# Patient Record
Sex: FEMALE | Race: BLACK OR AFRICAN AMERICAN | NOT HISPANIC OR LATINO | Employment: FULL TIME | URBAN - NONMETROPOLITAN AREA
[De-identification: names, ages, dates, MRNs, and addresses within clinical notes are randomized per-mention and may not be internally consistent; named-entity substitution may affect disease eponyms.]

---

## 2019-04-17 ENCOUNTER — OFFICE VISIT (OUTPATIENT)
Dept: NEUROSURGERY | Facility: CLINIC | Age: 49
End: 2019-04-17

## 2019-04-17 ENCOUNTER — APPOINTMENT (OUTPATIENT)
Dept: LAB | Facility: HOSPITAL | Age: 49
End: 2019-04-17

## 2019-04-17 VITALS
SYSTOLIC BLOOD PRESSURE: 100 MMHG | BODY MASS INDEX: 43.58 KG/M2 | DIASTOLIC BLOOD PRESSURE: 70 MMHG | WEIGHT: 202 LBS | HEIGHT: 57 IN

## 2019-04-17 DIAGNOSIS — E66.01 CLASS 3 SEVERE OBESITY DUE TO EXCESS CALORIES WITHOUT SERIOUS COMORBIDITY WITH BODY MASS INDEX (BMI) OF 40.0 TO 44.9 IN ADULT (HCC): ICD-10-CM

## 2019-04-17 DIAGNOSIS — Z98.2 VP (VENTRICULOPERITONEAL) SHUNT STATUS: Primary | ICD-10-CM

## 2019-04-17 DIAGNOSIS — G91.9 HYDROCEPHALUS (HCC): ICD-10-CM

## 2019-04-17 DIAGNOSIS — R50.81 FEVER IN OTHER DISEASES: ICD-10-CM

## 2019-04-17 LAB
ALBUMIN SERPL-MCNC: 4.8 G/DL (ref 3.5–5)
ALBUMIN/GLOB SERPL: 1.3 G/DL (ref 1.1–2.5)
ALP SERPL-CCNC: 63 U/L (ref 24–120)
ALT SERPL W P-5'-P-CCNC: 20 U/L (ref 0–54)
ANION GAP SERPL CALCULATED.3IONS-SCNC: 12 MMOL/L (ref 4–13)
AST SERPL-CCNC: 31 U/L (ref 7–45)
BILIRUB SERPL-MCNC: 0.5 MG/DL (ref 0.1–1)
BUN BLD-MCNC: 13 MG/DL (ref 5–21)
BUN/CREAT SERPL: 22.8 (ref 7–25)
CALCIUM SPEC-SCNC: 10 MG/DL (ref 8.4–10.4)
CHLORIDE SERPL-SCNC: 103 MMOL/L (ref 98–110)
CO2 SERPL-SCNC: 26 MMOL/L (ref 24–31)
CREAT BLD-MCNC: 0.57 MG/DL (ref 0.5–1.4)
CRP SERPL-MCNC: <0.5 MG/DL (ref 0–0.99)
DEPRECATED RDW RBC AUTO: 38.3 FL (ref 40–54)
EOSINOPHIL # BLD MANUAL: 0.35 10*3/MM3 (ref 0–0.7)
EOSINOPHIL NFR BLD MANUAL: 6.1 % (ref 0–4)
ERYTHROCYTE [DISTWIDTH] IN BLOOD BY AUTOMATED COUNT: 12.4 % (ref 12–15)
GFR SERPL CREATININE-BSD FRML MDRD: 138 ML/MIN/1.73
GLOBULIN UR ELPH-MCNC: 3.7 GM/DL
GLUCOSE BLD-MCNC: 80 MG/DL (ref 70–100)
HCT VFR BLD AUTO: 36.5 % (ref 37–47)
HGB BLD-MCNC: 12 G/DL (ref 12–16)
LYMPHOCYTES # BLD MANUAL: 0.8 10*3/MM3 (ref 0.72–4.86)
LYMPHOCYTES NFR BLD MANUAL: 14.1 % (ref 15–45)
LYMPHOCYTES NFR BLD MANUAL: 6.1 % (ref 4–12)
MCH RBC QN AUTO: 27.8 PG (ref 28–32)
MCHC RBC AUTO-ENTMCNC: 32.9 G/DL (ref 33–36)
MCV RBC AUTO: 84.7 FL (ref 82–98)
MONOCYTES # BLD AUTO: 0.35 10*3/MM3 (ref 0.19–1.3)
NEUTROPHILS # BLD AUTO: 4.12 10*3/MM3 (ref 1.87–8.4)
NEUTROPHILS NFR BLD MANUAL: 71.7 % (ref 39–78)
NEUTS BAND NFR BLD MANUAL: 1 % (ref 0–10)
PLAT MORPH BLD: NORMAL
PLATELET # BLD AUTO: 277 10*3/MM3 (ref 130–400)
PMV BLD AUTO: 11 FL (ref 6–12)
POTASSIUM BLD-SCNC: 4.4 MMOL/L (ref 3.5–5.3)
PROT SERPL-MCNC: 8.5 G/DL (ref 6.3–8.7)
RBC # BLD AUTO: 4.31 10*6/MM3 (ref 4.2–5.4)
RBC MORPH BLD: NORMAL
SODIUM BLD-SCNC: 141 MMOL/L (ref 135–145)
VARIANT LYMPHS NFR BLD MANUAL: 1 % (ref 0–5)
WBC MORPH BLD: NORMAL
WBC NRBC COR # BLD: 5.67 10*3/MM3 (ref 4.8–10.8)

## 2019-04-17 PROCEDURE — 99204 OFFICE O/P NEW MOD 45 MIN: CPT | Performed by: NURSE PRACTITIONER

## 2019-04-17 PROCEDURE — 85027 COMPLETE CBC AUTOMATED: CPT | Performed by: NURSE PRACTITIONER

## 2019-04-17 PROCEDURE — 36415 COLL VENOUS BLD VENIPUNCTURE: CPT | Performed by: NURSE PRACTITIONER

## 2019-04-17 PROCEDURE — 86140 C-REACTIVE PROTEIN: CPT | Performed by: NURSE PRACTITIONER

## 2019-04-17 PROCEDURE — 85007 BL SMEAR W/DIFF WBC COUNT: CPT | Performed by: NURSE PRACTITIONER

## 2019-04-17 PROCEDURE — 80053 COMPREHEN METABOLIC PANEL: CPT | Performed by: NURSE PRACTITIONER

## 2019-04-17 NOTE — PATIENT INSTRUCTIONS
"DASH Eating Plan  DASH stands for \"Dietary Approaches to Stop Hypertension.\" The DASH eating plan is a healthy eating plan that has been shown to reduce high blood pressure (hypertension). It may also reduce your risk for type 2 diabetes, heart disease, and stroke. The DASH eating plan may also help with weight loss.  What are tips for following this plan?  General guidelines  · Avoid eating more than 2,300 mg (milligrams) of salt (sodium) a day. If you have hypertension, you may need to reduce your sodium intake to 1,500 mg a day.  · Limit alcohol intake to no more than 1 drink a day for nonpregnant women and 2 drinks a day for men. One drink equals 12 oz of beer, 5 oz of wine, or 1½ oz of hard liquor.  · Work with your health care provider to maintain a healthy body weight or to lose weight. Ask what an ideal weight is for you.  · Get at least 30 minutes of exercise that causes your heart to beat faster (aerobic exercise) most days of the week. Activities may include walking, swimming, or biking.  · Work with your health care provider or diet and nutrition specialist (dietitian) to adjust your eating plan to your individual calorie needs.  Reading food labels  · Check food labels for the amount of sodium per serving. Choose foods with less than 5 percent of the Daily Value of sodium. Generally, foods with less than 300 mg of sodium per serving fit into this eating plan.  · To find whole grains, look for the word \"whole\" as the first word in the ingredient list.  Shopping  · Buy products labeled as \"low-sodium\" or \"no salt added.\"  · Buy fresh foods. Avoid canned foods and premade or frozen meals.  Cooking  · Avoid adding salt when cooking. Use salt-free seasonings or herbs instead of table salt or sea salt. Check with your health care provider or pharmacist before using salt substitutes.  · Do not bowles foods. Cook foods using healthy methods such as baking, boiling, grilling, and broiling instead.  · Cook with " heart-healthy oils, such as olive, canola, soybean, or sunflower oil.  Meal planning    · Eat a balanced diet that includes:  ? 5 or more servings of fruits and vegetables each day. At each meal, try to fill half of your plate with fruits and vegetables.  ? Up to 6-8 servings of whole grains each day.  ? Less than 6 oz of lean meat, poultry, or fish each day. A 3-oz serving of meat is about the same size as a deck of cards. One egg equals 1 oz.  ? 2 servings of low-fat dairy each day.  ? A serving of nuts, seeds, or beans 5 times each week.  ? Heart-healthy fats. Healthy fats called Omega-3 fatty acids are found in foods such as flaxseeds and coldwater fish, like sardines, salmon, and mackerel.  · Limit how much you eat of the following:  ? Canned or prepackaged foods.  ? Food that is high in trans fat, such as fried foods.  ? Food that is high in saturated fat, such as fatty meat.  ? Sweets, desserts, sugary drinks, and other foods with added sugar.  ? Full-fat dairy products.  · Do not salt foods before eating.  · Try to eat at least 2 vegetarian meals each week.  · Eat more home-cooked food and less restaurant, buffet, and fast food.  · When eating at a restaurant, ask that your food be prepared with less salt or no salt, if possible.  What foods are recommended?  The items listed may not be a complete list. Talk with your dietitian about what dietary choices are best for you.  Grains  Whole-grain or whole-wheat bread. Whole-grain or whole-wheat pasta. Brown rice. Oatmeal. Quinoa. Bulgur. Whole-grain and low-sodium cereals. Marcy bread. Low-fat, low-sodium crackers. Whole-wheat flour tortillas.  Vegetables  Fresh or frozen vegetables (raw, steamed, roasted, or grilled). Low-sodium or reduced-sodium tomato and vegetable juice. Low-sodium or reduced-sodium tomato sauce and tomato paste. Low-sodium or reduced-sodium canned vegetables.  Fruits  All fresh, dried, or frozen fruit. Canned fruit in natural juice (without  added sugar).  Meat and other protein foods  Skinless chicken or turkey. Ground chicken or turkey. Pork with fat trimmed off. Fish and seafood. Egg whites. Dried beans, peas, or lentils. Unsalted nuts, nut butters, and seeds. Unsalted canned beans. Lean cuts of beef with fat trimmed off. Low-sodium, lean deli meat.  Dairy  Low-fat (1%) or fat-free (skim) milk. Fat-free, low-fat, or reduced-fat cheeses. Nonfat, low-sodium ricotta or cottage cheese. Low-fat or nonfat yogurt. Low-fat, low-sodium cheese.  Fats and oils  Soft margarine without trans fats. Vegetable oil. Low-fat, reduced-fat, or light mayonnaise and salad dressings (reduced-sodium). Canola, safflower, olive, soybean, and sunflower oils. Avocado.  Seasoning and other foods  Herbs. Spices. Seasoning mixes without salt. Unsalted popcorn and pretzels. Fat-free sweets.  What foods are not recommended?  The items listed may not be a complete list. Talk with your dietitian about what dietary choices are best for you.  Grains  Baked goods made with fat, such as croissants, muffins, or some breads. Dry pasta or rice meal packs.  Vegetables  Creamed or fried vegetables. Vegetables in a cheese sauce. Regular canned vegetables (not low-sodium or reduced-sodium). Regular canned tomato sauce and paste (not low-sodium or reduced-sodium). Regular tomato and vegetable juice (not low-sodium or reduced-sodium). Pickles. Olives.  Fruits  Canned fruit in a light or heavy syrup. Fried fruit. Fruit in cream or butter sauce.  Meat and other protein foods  Fatty cuts of meat. Ribs. Fried meat. Simental. Sausage. Bologna and other processed lunch meats. Salami. Fatback. Hotdogs. Bratwurst. Salted nuts and seeds. Canned beans with added salt. Canned or smoked fish. Whole eggs or egg yolks. Chicken or turkey with skin.  Dairy  Whole or 2% milk, cream, and half-and-half. Whole or full-fat cream cheese. Whole-fat or sweetened yogurt. Full-fat cheese. Nondairy creamers. Whipped toppings.  "Processed cheese and cheese spreads.  Fats and oils  Butter. Stick margarine. Lard. Shortening. Ghee. Simental fat. Tropical oils, such as coconut, palm kernel, or palm oil.  Seasoning and other foods  Salted popcorn and pretzels. Onion salt, garlic salt, seasoned salt, table salt, and sea salt. Worcestershire sauce. Tartar sauce. Barbecue sauce. Teriyaki sauce. Soy sauce, including reduced-sodium. Steak sauce. Canned and packaged gravies. Fish sauce. Oyster sauce. Cocktail sauce. Horseradish that you find on the shelf. Ketchup. Mustard. Meat flavorings and tenderizers. Bouillon cubes. Hot sauce and Tabasco sauce. Premade or packaged marinades. Premade or packaged taco seasonings. Relishes. Regular salad dressings.  Where to find more information:  · National Heart, Lung, and Blood [place]: www.nhlbi.nih.gov  · American Heart Association: www.heart.org  Summary  · The DASH eating plan is a healthy eating plan that has been shown to reduce high blood pressure (hypertension). It may also reduce your risk for type 2 diabetes, heart disease, and stroke.  · With the DASH eating plan, you should limit salt (sodium) intake to 2,300 mg a day. If you have hypertension, you may need to reduce your sodium intake to 1,500 mg a day.  · When on the DASH eating plan, aim to eat more fresh fruits and vegetables, whole grains, lean proteins, low-fat dairy, and heart-healthy fats.  · Work with your health care provider or diet and nutrition specialist (dietitian) to adjust your eating plan to your individual calorie needs.  This information is not intended to replace advice given to you by your health care provider. Make sure you discuss any questions you have with your health care provider.  Document Released: 12/06/2012 Document Revised: 12/11/2017 Document Reviewed: 12/11/2017  [employer] Interactive Patient Education © 2019 [employer]    DASH Eating Plan  DASH stands for \"Dietary Approaches to Stop Hypertension.\" The DASH eating " "plan is a healthy eating plan that has been shown to reduce high blood pressure (hypertension). It may also reduce your risk for type 2 diabetes, heart disease, and stroke. The DASH eating plan may also help with weight loss.  What are tips for following this plan?  General guidelines  · Avoid eating more than 2,300 mg (milligrams) of salt (sodium) a day. If you have hypertension, you may need to reduce your sodium intake to 1,500 mg a day.  · Limit alcohol intake to no more than 1 drink a day for nonpregnant women and 2 drinks a day for men. One drink equals 12 oz of beer, 5 oz of wine, or 1½ oz of hard liquor.  · Work with your health care provider to maintain a healthy body weight or to lose weight. Ask what an ideal weight is for you.  · Get at least 30 minutes of exercise that causes your heart to beat faster (aerobic exercise) most days of the week. Activities may include walking, swimming, or biking.  · Work with your health care provider or diet and nutrition specialist (dietitian) to adjust your eating plan to your individual calorie needs.  Reading food labels  · Check food labels for the amount of sodium per serving. Choose foods with less than 5 percent of the Daily Value of sodium. Generally, foods with less than 300 mg of sodium per serving fit into this eating plan.  · To find whole grains, look for the word \"whole\" as the first word in the ingredient list.  Shopping  · Buy products labeled as \"low-sodium\" or \"no salt added.\"  · Buy fresh foods. Avoid canned foods and premade or frozen meals.  Cooking  · Avoid adding salt when cooking. Use salt-free seasonings or herbs instead of table salt or sea salt. Check with your health care provider or pharmacist before using salt substitutes.  · Do not bowles foods. Cook foods using healthy methods such as baking, boiling, grilling, and broiling instead.  · Cook with heart-healthy oils, such as olive, canola, soybean, or sunflower oil.  Meal planning    · Eat a " balanced diet that includes:  ? 5 or more servings of fruits and vegetables each day. At each meal, try to fill half of your plate with fruits and vegetables.  ? Up to 6-8 servings of whole grains each day.  ? Less than 6 oz of lean meat, poultry, or fish each day. A 3-oz serving of meat is about the same size as a deck of cards. One egg equals 1 oz.  ? 2 servings of low-fat dairy each day.  ? A serving of nuts, seeds, or beans 5 times each week.  ? Heart-healthy fats. Healthy fats called Omega-3 fatty acids are found in foods such as flaxseeds and coldwater fish, like sardines, salmon, and mackerel.  · Limit how much you eat of the following:  ? Canned or prepackaged foods.  ? Food that is high in trans fat, such as fried foods.  ? Food that is high in saturated fat, such as fatty meat.  ? Sweets, desserts, sugary drinks, and other foods with added sugar.  ? Full-fat dairy products.  · Do not salt foods before eating.  · Try to eat at least 2 vegetarian meals each week.  · Eat more home-cooked food and less restaurant, buffet, and fast food.  · When eating at a restaurant, ask that your food be prepared with less salt or no salt, if possible.  What foods are recommended?  The items listed may not be a complete list. Talk with your dietitian about what dietary choices are best for you.  Grains  Whole-grain or whole-wheat bread. Whole-grain or whole-wheat pasta. Brown rice. Oatmeal. Quinoa. Bulgur. Whole-grain and low-sodium cereals. Marcy bread. Low-fat, low-sodium crackers. Whole-wheat flour tortillas.  Vegetables  Fresh or frozen vegetables (raw, steamed, roasted, or grilled). Low-sodium or reduced-sodium tomato and vegetable juice. Low-sodium or reduced-sodium tomato sauce and tomato paste. Low-sodium or reduced-sodium canned vegetables.  Fruits  All fresh, dried, or frozen fruit. Canned fruit in natural juice (without added sugar).  Meat and other protein foods  Skinless chicken or turkey. Ground chicken or  turkey. Pork with fat trimmed off. Fish and seafood. Egg whites. Dried beans, peas, or lentils. Unsalted nuts, nut butters, and seeds. Unsalted canned beans. Lean cuts of beef with fat trimmed off. Low-sodium, lean deli meat.  Dairy  Low-fat (1%) or fat-free (skim) milk. Fat-free, low-fat, or reduced-fat cheeses. Nonfat, low-sodium ricotta or cottage cheese. Low-fat or nonfat yogurt. Low-fat, low-sodium cheese.  Fats and oils  Soft margarine without trans fats. Vegetable oil. Low-fat, reduced-fat, or light mayonnaise and salad dressings (reduced-sodium). Canola, safflower, olive, soybean, and sunflower oils. Avocado.  Seasoning and other foods  Herbs. Spices. Seasoning mixes without salt. Unsalted popcorn and pretzels. Fat-free sweets.  What foods are not recommended?  The items listed may not be a complete list. Talk with your dietitian about what dietary choices are best for you.  Grains  Baked goods made with fat, such as croissants, muffins, or some breads. Dry pasta or rice meal packs.  Vegetables  Creamed or fried vegetables. Vegetables in a cheese sauce. Regular canned vegetables (not low-sodium or reduced-sodium). Regular canned tomato sauce and paste (not low-sodium or reduced-sodium). Regular tomato and vegetable juice (not low-sodium or reduced-sodium). Pickles. Olives.  Fruits  Canned fruit in a light or heavy syrup. Fried fruit. Fruit in cream or butter sauce.  Meat and other protein foods  Fatty cuts of meat. Ribs. Fried meat. Simental. Sausage. Bologna and other processed lunch meats. Salami. Fatback. Hotdogs. Bratwurst. Salted nuts and seeds. Canned beans with added salt. Canned or smoked fish. Whole eggs or egg yolks. Chicken or turkey with skin.  Dairy  Whole or 2% milk, cream, and half-and-half. Whole or full-fat cream cheese. Whole-fat or sweetened yogurt. Full-fat cheese. Nondairy creamers. Whipped toppings. Processed cheese and cheese spreads.  Fats and oils  Butter. Stick margarine. Lard.  Shortening. Ghee. Simental fat. Tropical oils, such as coconut, palm kernel, or palm oil.  Seasoning and other foods  Salted popcorn and pretzels. Onion salt, garlic salt, seasoned salt, table salt, and sea salt. Worcestershire sauce. Tartar sauce. Barbecue sauce. Teriyaki sauce. Soy sauce, including reduced-sodium. Steak sauce. Canned and packaged gravies. Fish sauce. Oyster sauce. Cocktail sauce. Horseradish that you find on the shelf. Ketchup. Mustard. Meat flavorings and tenderizers. Bouillon cubes. Hot sauce and Tabasco sauce. Premade or packaged marinades. Premade or packaged taco seasonings. Relishes. Regular salad dressings.  Where to find more information:  · National Heart, Lung, and Blood [place]: www.nhlbi.nih.gov  · American Heart Association: www.heart.org  Summary  · The DASH eating plan is a healthy eating plan that has been shown to reduce high blood pressure (hypertension). It may also reduce your risk for type 2 diabetes, heart disease, and stroke.  · With the DASH eating plan, you should limit salt (sodium) intake to 2,300 mg a day. If you have hypertension, you may need to reduce your sodium intake to 1,500 mg a day.  · When on the DASH eating plan, aim to eat more fresh fruits and vegetables, whole grains, lean proteins, low-fat dairy, and heart-healthy fats.  · Work with your health care provider or diet and nutrition specialist (dietitian) to adjust your eating plan to your individual calorie needs.  This information is not intended to replace advice given to you by your health care provider. Make sure you discuss any questions you have with your health care provider.  Document Released: 12/06/2012 Document Revised: 12/11/2017 Document Reviewed: 12/11/2017  [employer] Interactive Patient Education © 2019 [employer]

## 2019-04-17 NOTE — PROGRESS NOTES
Primary Care Provider: Krishna Sanches MD  Requesting Provider: No ref. provider found    Chief Complaint:   Chief Complaint   Patient presents with   • Shunt swelling placed on right side of neck     [facility] in [facility] in 1970     History of Present Illness  Consultation today at the request of No ref. provider found    Mary Hunt is a 48 y.o. -American female who presents today with complaint of tenderness to right anterior lateral neck as well as intermittent headaches and dizziness.  Ms. Hunt states she had a  shunt placed in 1970 at the [facility] for hydrocephalus.  More specific reasoning for shunt placement is unknown.  She does report being born prematurely, however gestational age is unknown.    Onset of symptoms began approximately 1 week ago with tenderness overlying distal shunt tubing to right anterior lateral neck.  At onset she as well reports a low-grade fevers (.1).  She additionally reports intermittent headaches, primarily to the posterior aspect of her skull, and intermittent dizziness.  Her headaches occur randomly, typically last 4-5 hours, and resolved with use of ibuprofen and rest.  Last headache was 1 day ago.  Dizziness typically occurs with forward bending.  She denies head trauma, nausea, vomiting, diplopia, or seizure-like activity.  She currently rates the severity of her symptoms 7/10.  No additional concerns at this time.    Review of Systems   Constitutional: Positive for fatigue and fever.   Eyes: Negative.    Respiratory: Negative.    Cardiovascular: Negative.    Gastrointestinal: Negative.    Endocrine: Negative.    Genitourinary: Negative.    Musculoskeletal: Negative.    Skin: Negative.    Allergic/Immunologic: Negative.    Neurological: Positive for light-headedness and headaches.   Hematological: Negative.    Psychiatric/Behavioral: Negative.    All other systems reviewed and are negative.    Past Medical  "History:   Diagnosis Date   • Hydrocephalus     shunt has been placed for 47 years   • Medical history non-contributory      Past Surgical History:   Procedure Laterality Date   • TUBAL ABDOMINAL LIGATION       Family History: family history is not on file.    Social History:  reports that she has never smoked. She has never used smokeless tobacco. She reports that she does not drink alcohol or use drugs.    Medications:  No current outpatient medications on file.    Allergies:  Patient has no known allergies.    Objective   /70   Ht 144.8 cm (57\")   Wt 91.6 kg (202 lb)   BMI 43.71 kg/m²   Physical Exam   Constitutional: She is oriented to person, place, and time. She appears well-developed and well-nourished.  Non-toxic appearance. She does not have a sickly appearance. She does not appear ill. No distress.   HENT:   Head: Normocephalic and atraumatic.   Right Ear: Hearing normal.   Left Ear: Hearing normal.   Mouth/Throat: Mucous membranes are normal.   Eyes: Conjunctivae and EOM are normal. Pupils are equal, round, and reactive to light.   Neck: Trachea normal and full passive range of motion without pain. Neck supple.   Cardiovascular: Normal rate and regular rhythm.   Pulmonary/Chest: Effort normal. No accessory muscle usage. No apnea, no tachypnea and no bradypnea. No respiratory distress.   Abdominal: Soft. Normal appearance.   Neurological: She is alert and oriented to person, place, and time. Gait normal.   Reflex Scores:       Tricep reflexes are 2+ on the right side and 2+ on the left side.       Bicep reflexes are 2+ on the right side and 2+ on the left side.       Brachioradialis reflexes are 2+ on the right side and 2+ on the left side.       Patellar reflexes are 2+ on the right side and 2+ on the left side.       Achilles reflexes are 2+ on the right side and 2+ on the left side.  Skin: Skin is warm, dry and intact.   Psychiatric: She has a normal mood and affect. Her speech is normal and " behavior is normal.   Nursing note and vitals reviewed.    Neurologic Exam     Mental Status   Oriented to person, place, and time.   Attention: normal. Concentration: normal.   Speech: speech is normal   Level of consciousness: alert    Cranial Nerves     CN II   Visual fields full to confrontation.     CN III, IV, VI   Pupils are equal, round, and reactive to light.  Extraocular motions are normal.     CN V   Facial sensation intact.     CN VII   Facial expression full, symmetric.     CN VIII   CN VIII normal.     CN IX, X   CN IX normal.     CN XI   CN XI normal.     Motor Exam   Muscle bulk: normal  Overall muscle tone: normal  Right arm tone: normal  Left arm tone: normal  Right arm pronator drift: absent  Left arm pronator drift: absent  Right leg tone: normal  Left leg tone: normal    Strength   Right deltoid: 5/5  Left deltoid: 5/5  Right biceps: 5/5  Left biceps: 5/5  Right triceps: 5/5  Left triceps: 5/5  Right wrist extension: 5/5  Left wrist extension: 5/5  Right iliopsoas: 5/5  Left iliopsoas: 5/5  Right quadriceps: 5/5  Left quadriceps: 5/5  Right anterior tibial: 5/5  Left anterior tibial: 5/5  Right posterior tibial: 5/5  Left posterior tibial: 5/5    Sensory Exam   Light touch normal.     Gait, Coordination, and Reflexes     Gait  Gait: normal    Tremor   Resting tremor: absent  Intention tremor: absent  Action tremor: absent    Reflexes   Right brachioradialis: 2+  Left brachioradialis: 2+  Right biceps: 2+  Left biceps: 2+  Right triceps: 2+  Left triceps: 2+  Right patellar: 2+  Left patellar: 2+  Right achilles: 2+  Left achilles: 2+  Right : 2+  Left : 2+  Right plantar: normal  Left plantar: normal  Right Osuna: absent  Left Osuna: absent  Right ankle clonus: absent  Left ankle clonus: absent  Right pendular knee jerk: absent  Left pendular knee jerk: absent    Imaging: (independent review and interpretation)                     ASSESSMENT and PLAN  Mary Hunt is a 48 y.o.  female. She  has a past medical history of Hydrocephalus and Medical history non-contributory.. She presents with a new problem of tenderness overlying the right anterior lateral neck, and intermittent headaches and dizziness. Physical exam findings of normal neurologic exam.  Multiple images obtained, mildly dilated lateral ventricles,  shunt remnant to the posterior right lateral ventricle, and what appears to be calcified remnant of a distal boron shunt tubing to the right anterior lateral soft tissue neck.    In regards to reported fever, we will proceed today by obtaining a CBC, CMP, and CRP.  Ms. Hunt will be notified of any abnormal results and will proceed accordingly.    In regards to  shunt status and mild hydrocephalus we will proceed today by obtaining MRI of the brain for further evaluation.  We will also send the patient for a lumbar puncture with opening and closing pressures, as well as routine LP studies.  I additionally advised the patient to follow-up with her ophthalmologist for routine eye exam to include optic disc evaluation.  Dr. Stephens reviewed provided imaging and agrees with this plan of care.  I advised the patient to call to return sooner for worsening headaches, nausea, vomiting, or gait changes.  We will have Ms. Hunt return to follow-up with Dr. Stephens at his next available appointment.  Ms. Hunt agrees with this plan of care.     BMI today is 43.7.  Information on the DASH diet provided in the AVS.  We will continue to provided diet and exercise information with the goal of weight loss at each scheduled appointment.     Mary was seen today for shunt swelling placed on right side of neck.    Diagnoses and all orders for this visit:     (ventriculoperitoneal) shunt status  -     Obtain Informed Consent; Standing  -     Notify Provider if Patient Taking Blood Thinning Agents; Standing  -     Glucose, CSF - Cerebrospinal Fluid, Lumbar Puncture; Standing  -      Protein, CSF - Cerebrospinal Fluid, Lumbar Puncture; Standing  -     Culture, CSF - Cerebrospinal Fluid, Lumbar Puncture; Standing  -     Gram Stain; Standing  -     Cell Count With Differential, CSF Use tube: 1; Standing  -     IR Lumbar Puncture Diagnosis; Future  -     Check & Record Opening & Closing Pressures (LP); Standing  -     MRI Brain Without Contrast; Future    Hydrocephalus  -     Obtain Informed Consent; Standing  -     Notify Provider if Patient Taking Blood Thinning Agents; Standing  -     Glucose, CSF - Cerebrospinal Fluid, Lumbar Puncture; Standing  -     Protein, CSF - Cerebrospinal Fluid, Lumbar Puncture; Standing  -     Culture, CSF - Cerebrospinal Fluid, Lumbar Puncture; Standing  -     Gram Stain; Standing  -     Cell Count With Differential, CSF Use tube: 1; Standing  -     IR Lumbar Puncture Diagnosis; Future  -     Check & Record Opening & Closing Pressures (LP); Standing  -     MRI Brain Without Contrast; Future    Fever in other diseases  -     Notify Provider if Patient Taking Blood Thinning Agents; Standing  -     Glucose, CSF - Cerebrospinal Fluid, Lumbar Puncture; Standing  -     Protein, CSF - Cerebrospinal Fluid, Lumbar Puncture; Standing  -     Culture, CSF - Cerebrospinal Fluid, Lumbar Puncture; Standing  -     Gram Stain; Standing  -     Cell Count With Differential, CSF Use tube: 1; Standing  -     IR Lumbar Puncture Diagnosis; Future  -     Check & Record Opening & Closing Pressures (LP); Standing  -     CBC & Differential  -     Comprehensive Metabolic Panel  -     C-reactive Protein    Class 3 severe obesity due to excess calories without serious comorbidity with body mass index (BMI) of 40.0 to 44.9 in adult (CMS/[facility])    Return for Next scheduled follow up with Dr Stephens next available after testing.    Thank you for this Consultation and the opportunity to participate in [facility].    Sincerely,  VERENICE Palomares    Level of Risk: Moderate due to: undiagnosed new  problem  MDM: Moderate Complexity  (Mod = 63845, High = 35046)

## 2019-04-19 ENCOUNTER — TELEPHONE (OUTPATIENT)
Dept: NEUROSURGERY | Facility: CLINIC | Age: 49
End: 2019-04-19

## 2019-04-19 NOTE — TELEPHONE ENCOUNTER
----- Message from VERENICE Palomares sent at 4/18/2019  4:45 PM CDT -----  Relatively normal labs.      called patient with results of lab work. Patient voiced understanding.

## 2019-05-17 ENCOUNTER — APPOINTMENT (OUTPATIENT)
Dept: GENERAL RADIOLOGY | Facility: HOSPITAL | Age: 49
End: 2019-05-17

## 2019-05-17 ENCOUNTER — APPOINTMENT (OUTPATIENT)
Dept: MRI IMAGING | Facility: HOSPITAL | Age: 49
End: 2019-05-17

## 2019-05-31 ENCOUNTER — HOSPITAL ENCOUNTER (OUTPATIENT)
Dept: MRI IMAGING | Facility: HOSPITAL | Age: 49
Discharge: HOME | End: 2019-05-31
Admitting: RADIOLOGY

## 2019-05-31 ENCOUNTER — APPOINTMENT (OUTPATIENT)
Dept: GENERAL RADIOLOGY | Facility: HOSPITAL | Age: 49
End: 2019-05-31

## 2019-05-31 ENCOUNTER — HOSPITAL ENCOUNTER (OUTPATIENT)
Dept: GENERAL RADIOLOGY | Facility: HOSPITAL | Age: 49
Discharge: HOME | End: 2019-05-31

## 2019-05-31 VITALS
SYSTOLIC BLOOD PRESSURE: 131 MMHG | OXYGEN SATURATION: 99 % | HEART RATE: 72 BPM | WEIGHT: 198.13 LBS | TEMPERATURE: 97.7 F | HEIGHT: 57 IN | BODY MASS INDEX: 42.74 KG/M2 | DIASTOLIC BLOOD PRESSURE: 95 MMHG | RESPIRATION RATE: 12 BRPM

## 2019-05-31 DIAGNOSIS — R50.81 FEVER IN OTHER DISEASES: ICD-10-CM

## 2019-05-31 DIAGNOSIS — G91.9 HYDROCEPHALUS (HCC): ICD-10-CM

## 2019-05-31 DIAGNOSIS — Z98.2 VP (VENTRICULOPERITONEAL) SHUNT STATUS: ICD-10-CM

## 2019-05-31 LAB
APPEARANCE CSF: CLEAR
APPEARANCE CSF: CLEAR
APTT PPP: 29.2 SECONDS (ref 24.1–35)
COLOR CSF: COLORLESS
COLOR CSF: COLORLESS
COLOR SPUN CSF: COLORLESS
COLOR SPUN CSF: COLORLESS
GLUCOSE CSF-MCNC: 50 MG/DL (ref 40–70)
INR PPP: 1.01 (ref 0.91–1.09)
METHOD: NORMAL
METHOD: NORMAL
NUC CELL # CSF MANUAL: 1 /MM3 (ref 0–8)
NUC CELL # CSF MANUAL: 1 /MM3 (ref 0–8)
PLATELET # BLD AUTO: 267 10*3/MM3 (ref 130–400)
PROT CSF-MCNC: 56 MG/DL (ref 12–60)
PROTHROMBIN TIME: 13.6 SECONDS (ref 11.9–14.6)
RBC # CSF MANUAL: 0 /MM3 (ref 0–0)
RBC # CSF MANUAL: 0 /MM3 (ref 0–0)
SPECIMEN VOL CSF: 14 ML
SPECIMEN VOL CSF: 14 ML
TUBE # CSF: 1
TUBE # CSF: 2

## 2019-05-31 PROCEDURE — 87070 CULTURE OTHR SPECIMN AEROBIC: CPT | Performed by: NURSE PRACTITIONER

## 2019-05-31 PROCEDURE — 85610 PROTHROMBIN TIME: CPT | Performed by: NURSE PRACTITIONER

## 2019-05-31 PROCEDURE — 85730 THROMBOPLASTIN TIME PARTIAL: CPT | Performed by: NURSE PRACTITIONER

## 2019-05-31 PROCEDURE — 87015 SPECIMEN INFECT AGNT CONCNTJ: CPT | Performed by: NURSE PRACTITIONER

## 2019-05-31 PROCEDURE — 85049 AUTOMATED PLATELET COUNT: CPT | Performed by: NURSE PRACTITIONER

## 2019-05-31 PROCEDURE — 77003 FLUOROGUIDE FOR SPINE INJECT: CPT

## 2019-05-31 PROCEDURE — 70551 MRI BRAIN STEM W/O DYE: CPT

## 2019-05-31 PROCEDURE — 82945 GLUCOSE OTHER FLUID: CPT | Performed by: NURSE PRACTITIONER

## 2019-05-31 PROCEDURE — 89050 BODY FLUID CELL COUNT: CPT | Performed by: NURSE PRACTITIONER

## 2019-05-31 PROCEDURE — 87205 SMEAR GRAM STAIN: CPT | Performed by: NURSE PRACTITIONER

## 2019-05-31 PROCEDURE — 84157 ASSAY OF PROTEIN OTHER: CPT | Performed by: NURSE PRACTITIONER

## 2019-05-31 RX ORDER — LIDOCAINE HYDROCHLORIDE 10 MG/ML
5 INJECTION, SOLUTION EPIDURAL; INFILTRATION; INTRACAUDAL; PERINEURAL ONCE
Status: DISCONTINUED | OUTPATIENT
Start: 2019-05-31 | End: 2019-06-01 | Stop reason: HOSPADM

## 2019-06-05 LAB
BACTERIA SPEC AEROBE CULT: NORMAL
GRAM STN SPEC: NORMAL
GRAM STN SPEC: NORMAL

## 2019-06-06 ENCOUNTER — TELEPHONE (OUTPATIENT)
Dept: NEUROSURGERY | Facility: CLINIC | Age: 49
End: 2019-06-06

## 2019-06-06 NOTE — TELEPHONE ENCOUNTER
Spoke with Ms. Marilee and notified her that her opening lumbar pressure was normal and CSF studies were  Unremarkable.  I advised her to keep scheduled appointment with Dr. Stephens.  Call to return sooner for any new or additional concerns.    Gonzalez Camara, APRN

## 2019-06-24 ENCOUNTER — OFFICE VISIT (OUTPATIENT)
Dept: NEUROSURGERY | Facility: CLINIC | Age: 49
End: 2019-06-24

## 2019-06-24 VITALS — HEIGHT: 57 IN | BODY MASS INDEX: 42.72 KG/M2 | WEIGHT: 198 LBS

## 2019-06-24 DIAGNOSIS — E66.01 CLASS 3 SEVERE OBESITY DUE TO EXCESS CALORIES WITH SERIOUS COMORBIDITY AND BODY MASS INDEX (BMI) OF 40.0 TO 44.9 IN ADULT (HCC): ICD-10-CM

## 2019-06-24 DIAGNOSIS — G91.0 COMMUNICATING HYDROCEPHALUS (HCC): Primary | ICD-10-CM

## 2019-06-24 DIAGNOSIS — Z98.2 VP (VENTRICULOPERITONEAL) SHUNT STATUS: ICD-10-CM

## 2019-06-24 PROCEDURE — 99214 OFFICE O/P EST MOD 30 MIN: CPT | Performed by: NEUROLOGICAL SURGERY

## 2019-06-24 NOTE — PROGRESS NOTES
Chief complaint:   Chief Complaint   Patient presents with   • Follow-up     Follow for testing regarding her  shunt. Patient has no complaints.       Subjective     HPI:   From previous note: Mary Hunt is a 48 y.o. female. She  has a past medical history of Hydrocephalus and Medical history non-contributory.. She presents with a new problem of tenderness overlying the right anterior lateral neck, and intermittent headaches and dizziness. Physical exam findings of normal neurologic exam.  Multiple images obtained, mildly dilated lateral ventricles,  shunt remnant to the posterior right lateral ventricle, and what appears to be calcified remnant of a distal boron shunt tubing to the right anterior lateral soft tissue neck.     In regards to reported fever, we will proceed today by obtaining a CBC, CMP, and CRP.  Ms. Hunt will be notified of any abnormal results and will proceed accordingly.     In regards to  shunt status and mild hydrocephalus we will proceed today by obtaining MRI of the brain for further evaluation.  We will also send the patient for a lumbar puncture with opening and closing pressures, as well as routine LP studies.  I additionally advised the patient to follow-up with her ophthalmologist for routine eye exam to include optic disc evaluation.  Dr. Stephens reviewed provided imaging and agrees with this plan of care.  I advised the patient to call to return sooner for worsening headaches, nausea, vomiting, or gait changes.  We will have Ms. Hunt return to follow-up with Dr. Stephens at his next available appointment.  Ms. Hunt agrees with this plan of care.           Interval History: Mary returns today for follow-up regarding previous shunt.  Per documentation patient report the patient received a  shunt.  This was performed at [facility] by an unknown physician.  There is been no revisions in the interim.  She has done well and developed normally.   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  She recently had a fall which initiated a noncontrast head CT in April.  This was subsequently followed by MRI in June.  She also had a lumbar puncture performed which showed an opening pressure of 17.  The patient denies headaches, nausea, vomiting, or vision changes.  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  She did however note that after the fall she did have worsening swelling of her calcified tubing in her neck.    Review of Systems   Constitutional: Negative.    HENT: Negative.    Eyes: Negative.    Respiratory: Negative.    Cardiovascular: Negative.    Gastrointestinal: Negative.    Endocrine: Negative.    Genitourinary: Negative.    Musculoskeletal: Negative.    Skin: Negative.    Allergic/Immunologic: Negative.    Neurological: Negative.    Hematological: Negative.    Psychiatric/Behavioral: Negative.        PFSH:  Past Medical History:   Diagnosis Date   • Hydrocephalus     shunt has been placed for 47 years   • Medical history non-contributory        Past Surgical History:   Procedure Laterality Date   • TUBAL ABDOMINAL LIGATION         Objective      No current outpatient medications on file.     No current facility-administered medications for this visit.        Vital Signs  Ht 144.8 cm (57\")   Wt 89.8 kg (198 lb)   BMI 42.85 kg/m²   Physical Exam   Constitutional: She is oriented to person, place, and time.   HENT:   Head:       Eyes: EOM are normal. Pupils are equal, round, and reactive to light.   Neurological: She is oriented to person, place, and time. She has normal strength. Gait normal.   Psychiatric: Her speech is normal.     Neurologic Exam     Mental Status   Oriented to person, place, and time.   Speech: speech is normal     Cranial Nerves     CN II   Visual fields full to confrontation.     CN III, IV, VI   Pupils are equal, round, and reactive to " light.  Extraocular motions are normal.     CN V   Right facial sensation deficit: none  Left facial sensation deficit: none    CN VII   Facial expression full, symmetric.     CN VIII   Hearing: intact    CN IX, X   Palate: symmetric    CN XI   Right sternocleidomastoid strength: normal  Left sternocleidomastoid strength: normal    CN XII   Tongue deviation: none    Motor Exam     Strength   Strength 5/5 throughout.     Sensory Exam   Right arm light touch: normal  Left arm light touch: normal    Gait, Coordination, and Reflexes     Gait  Gait: normal    Reflexes   Reflexes 2+ except as noted.     (12 bullet pts)    Results Review:     EXAMINATION:  IR LUMBAR PUNCTURE DIAG/THERA-  5/31/2019 11:07 AM CDT     HISTORY: shunt status, hydrocephalus; Z98.2-Presence of cerebrospinal  fluid drainage device; G91.9-Hydrocephalus, unspecified; R50.81-Fever  presenting with conditions classified elsewhere      COMPARISON: No comparison study.     TECHNIQUE:      Image number: 1.     Fluoroscopy time: 0.8 minutes     The risk and procedure were explained to the patient. Informed consent  was obtained. Timeout was executed per departmental policy.     Under sterile conditions, using 1% lidocaine as local anesthetic and  fluoroscopic guidance, a 20-gauge spinal needle was placed into the  thecal sac at the L3-L4 level.     Patient was placed on their side and an opening pressure of 12 cm was  measured.     Approximately 15 cc of clear cerebral spinal fluid was harvested into  test tubes and sent for laboratory for evaluation as requested.     Minimal blood loss observed, less than 1 mL.     The patient tolerated the procedure well without immediate  complications.     IMPRESSION:  1. Fluoroscopic guided lumbar puncture performed as described above,  without immediate complications.  This report was finalized on 05/31/2019 15:21 by Dr. Evans Jain MD.    Indication: Hydrocephalus        Technique: Multisequence, multiplanar MRI  of the brain without contrast.     Comparison: CT scan dated 2019     Findings:      There is no diffusion signal abnormality to suggest acute ischemia. No  intra-axial or extra-axial hemorrhage. No visualized mass lesion. Right  temporal approach ventriculostomy. Prominence of the occipital horns of  the lateral ventricles. The frontal horns the lateral ventricles are  nondilated. The third and fourth ventricles appear appropriate. The  septum pellucidum is nonvisualized. The splenium of the corpus callosum  is notably thin, asymmetric relative to the genu and anterior body. The  basal cisterns are symmetric. Posterior fossa structures are  unremarkable. The major intracranial flow-voids are preserved. Normal  orbits. The paranasal sinuses are clear. Mastoid air cells are clear.  Normal bone marrow signal.      IMPRESSION:  Impression:     1. There is a right temporal approach ventriculostomy. The occipital  horns of the lateral ventricles are prominent, however, the frontal and  temporal horns appear appropriate. Similarly, the third and fourth  ventricles appear appropriate. 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  This report was finalized on 2019 13:42 by Dr Ramna Shelton, .          Assessment/Plan: Mary Hunt is a 49 y.o. female with a significant comorbidity agenesis of the corpus callosum, colpocephaly, status post  ventricular peritoneal shunting without revision. She presents with a known problem of swelling in her neck along with residual tubing. Physical exam findings of illogically intact.  Her imaging shows stable MRI with regards to ventricular size compared to her prior CT.     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  She underwent a  shunt.  This appears to have deteriorated and not been functioning for many years.  Her imaging shows stable from ventricles as well as lumbar puncture with normal opening pressure.  At this point I would consider her to not have hydrocephalus and would recommend further conservative management.  In regards to the swelling along her shunt tubing.  It appears that she has extensive calcifications along this tubing which is not uncommon with the older shunts which were either made of latex or boron encrusted which tended to create a significant reaction.  At some point I imagine she twisted her neck and caused a crack in the granulation tissue surrounding the shunt tubing thus initiating a secondary reaction.  I would recommend continued conservative therapy.  Should this become painful, cosmetically displeasing, or compressive of the underlying structures then we would consider removal.  But as of this time it is best to leave it in place.    Obesity Counseling  Mary has a BMI 40.0-49.9       Classification: class III obesity.  We spent 5 minutes in weight management counseling including discussing current weight, current diet, and exercise patterns.  Additional we set goals for weight reduction.  Therefore above normal parameters. Recommendations include: educational material and exercise counseling.       1. Communicating hydrocephalus    2.  (ventriculoperitoneal) shunt status    3. Class 3 severe obesity due to excess calories with serious comorbidity and body mass index (BMI) of 40.0 to 44.9 in adult (CMS/[facility])        Recommendations:  Mary was seen today for follow-up.    Diagnoses and all orders for this visit:    Communicating hydrocephalus     (ventriculoperitoneal) shunt status    Class 3 severe obesity due to excess calories with serious comorbidity and body mass index (BMI) of 40.0 to 44.9 in adult (CMS/[facility])        Return if symptoms worsen or  fail to improve.    Level of Risk: Moderate due to: two stable chronic illnesses  MDM: Moderate Complexity  (Mod = 03700, High = 46870)    Thank you, for allowing me to continue to participate in the care of this patient.    Sincerely,  Edu Stephens MD

## 2021-05-17 ENCOUNTER — TELEPHONE (OUTPATIENT)
Dept: BARIATRICS/WEIGHT MGMT | Facility: CLINIC | Age: 51
End: 2021-05-17

## 2021-05-18 ENCOUNTER — OFFICE VISIT (OUTPATIENT)
Dept: BARIATRICS/WEIGHT MGMT | Facility: HOSPITAL | Age: 51
End: 2021-05-18

## 2021-05-18 ENCOUNTER — OFFICE VISIT (OUTPATIENT)
Dept: BARIATRICS/WEIGHT MGMT | Facility: CLINIC | Age: 51
End: 2021-05-18

## 2021-05-18 VITALS
TEMPERATURE: 98.5 F | OXYGEN SATURATION: 95 % | BODY MASS INDEX: 45.59 KG/M2 | DIASTOLIC BLOOD PRESSURE: 81 MMHG | WEIGHT: 217.2 LBS | HEART RATE: 81 BPM | HEIGHT: 58 IN | SYSTOLIC BLOOD PRESSURE: 142 MMHG

## 2021-05-18 DIAGNOSIS — E66.01 CLASS 3 SEVERE OBESITY DUE TO EXCESS CALORIES WITH SERIOUS COMORBIDITY AND BODY MASS INDEX (BMI) OF 45.0 TO 49.9 IN ADULT (HCC): Primary | ICD-10-CM

## 2021-05-18 PROCEDURE — 99203 OFFICE O/P NEW LOW 30 MIN: CPT | Performed by: SURGERY

## 2021-05-18 RX ORDER — CYCLOBENZAPRINE HCL 10 MG
10 TABLET ORAL 3 TIMES DAILY PRN
COMMUNITY
End: 2021-09-13

## 2021-05-18 NOTE — PROGRESS NOTES
Nutrition Services    Patient Name:  Mary Hunt  YOB: 1970  MRN: [id number]  Admit Date:  (Not on file)    NUTRITION BARIATRIC/M[facility] NOTE     Visit-1   Initial Assessment   BA#   MWL    Anthropometrics   Height: 57.75 in  Weight: 217 lbs 3.2 oz  BMI: 45.79    Nutrition Recall  Eating __2____ meals daily   Snacking  Excessive sweet intake  Drinking carbonated beverages  Drinking less than 64 fluid ounces-16 oz per day    Education    Goal Setting and Information Packet  4 meal per day diet plan  4 meal per day sample menu    Nutrition Goals   Eat __4___ meals per day with protein  Eat protein first at meals  Protein goal: 65gms   discussed protein guidelines for shakes and bar  Eliminate snacks  Healthier food choices  Portion control / Use smaller plate   Eliminate soda  Increase fluid intake to 64 ounces per day      Electronically signed by:  Anaid Mcbride  05/18/21 10:48 CDT

## 2021-05-18 NOTE — PROGRESS NOTES
Patient Care Team:  Krishna Sanches MD as PCP - General (Family Medicine)    Reason for Visit:  Surgical Weight loss    Subjective     Patient is a 50 y.o. female presents with morbid obesity and her Body mass index is 45.79 kg/m².     She is here for discussion of surgical weight loss options.  She stated she has been with the disease of obesity for year(s).  She stated she suffers from morbid obesity due to her weight gain.  She stated that weight loss helps alleviate these symptoms.   She stated that she has tried multiple diet regimens including the keto diet, Slim fast and medication such as Dexatrim to help with weight loss.  She stated that she has attempted these conservative methods for weight loss without maintaining long term success.  Today she would like to discuss surgical weight loss options such as the Laparoscopic Sleeve Gastrectomy or the Laparoscopic R - Y Gastric Bypass.     Review of Systems  General ROS: positive for  - fatigue, night sweats and weight gain  Psychological ROS: negative  Respiratory ROS: no cough, shortness of breath, or wheezing  Cardiovascular ROS: no chest pain or dyspnea on exertion  positive for - edema  Gastrointestinal ROS: no abdominal pain, change in bowel habits, or black or bloody stools  Musculoskeletal ROS: positive for - joint pain    History  Past Medical History:   Diagnosis Date   • Hydrocephalus (CMS/HCC)     shunt has been placed for 47 years   • Medical history non-contributory      Past Surgical History:   Procedure Laterality Date   • TUBAL ABDOMINAL LIGATION       History reviewed. No pertinent family history.  Social History     Tobacco Use   • Smoking status: Never Smoker   • Smokeless tobacco: Never Used   Substance Use Topics   • Alcohol use: No   • Drug use: No     E-cigarette/Vaping     E-cigarette/Vaping Substances     (Not in a hospital admission)    Allergies:  Patient has no known allergies.      Current Outpatient Medications:   •   "cyclobenzaprine (FLEXERIL) 10 MG tablet, Take 10 mg by mouth 3 (Three) Times a Day As Needed for Muscle Spasms., Disp: , Rfl:     Objective     Vital Signs  Temp:  [98.5 °F (36.9 °C)] 98.5 °F (36.9 °C)  Heart Rate:  [81] 81  BP: (142)/(81) 142/81  Body mass index is 45.79 kg/m².      05/18/21  0941   Weight: 98.5 kg (217 lb 3.2 oz)       General Appearance:  awake, alert, oriented, in no acute distress  Lungs:  Normal expansion.  Clear to auscultation.  No rales, rhonchi, or wheezing.  Heart:  Heart regular rate and rhythm  Abdomen:  Soft, non-tender, normal bowel sounds; no bruits, organomegaly or masses.  Abnormal shape: obese      Results Review:   None        Assessment/Plan   Encounter Diagnoses   Name Primary?   • Class 3 severe obesity due to excess calories with serious comorbidity and body mass index (BMI) of 45.0 to 49.9 in adult (CMS/[facility]) Yes       She has been provided a structured dietary regimen based off of her behavior.  I discussed with the patient the etiology of the disease of obesity and the potential comorbid conditions associated with this disease.  She was instructed to follow the dietary regimen and follow-up with our program in 1 month's time with any additional questions as they may arise during this time.  We emphasized on focusing on proteins and meals high in fiber as well as adequate hydration that exceed 64 ounces of water daily.    I explained that I anticipate the patient to lose weight prior to her next monthly visit.  I have also explained that they need to record or document when they are going to have the \"cheat day\".  I believe this patient will be a good candidate for weight loss surgery.    She has chosen laparoscopic sleeve gastrectomy. I agree with this decision.  I have discussed the Kayla - Y Gastric Bypass, laparoscopic sleeve gastrectomy and the Laparoscopic Gastric Band procedures to provide the alternatives which also includes non surgical weight loss options as well.  " We discussed the benefits of the surgeries including the benefit of weight loss and the possible reversal of co-morbid conditions associated with morbid obesity. I explained to her that prior to making a definitive decision on the type of surgery she will require a study of the upper GI system.  I explained to her why the EGD is recommended.    I discussed the patient's findings and my recommendations with patient.     I have also recommended that she obtain completion of a medically supervised weight loss program prior to surgery consideration.    Dr. Matthew Oh MD FACS    05/18/21  10:21 CDT  Patient Care Team:  Krishna Sanches MD as PCP - General (Family Medicine)

## 2021-06-15 ENCOUNTER — TELEPHONE (OUTPATIENT)
Dept: BARIATRICS/WEIGHT MGMT | Facility: CLINIC | Age: 51
End: 2021-06-15

## 2021-06-16 ENCOUNTER — OFFICE VISIT (OUTPATIENT)
Dept: BARIATRICS/WEIGHT MGMT | Facility: CLINIC | Age: 51
End: 2021-06-16

## 2021-06-16 VITALS
HEART RATE: 90 BPM | WEIGHT: 213.8 LBS | SYSTOLIC BLOOD PRESSURE: 134 MMHG | DIASTOLIC BLOOD PRESSURE: 84 MMHG | BODY MASS INDEX: 41.98 KG/M2 | HEIGHT: 60 IN | TEMPERATURE: 97.2 F | OXYGEN SATURATION: 97 %

## 2021-06-16 DIAGNOSIS — E66.01 CLASS 3 SEVERE OBESITY DUE TO EXCESS CALORIES WITH SERIOUS COMORBIDITY AND BODY MASS INDEX (BMI) OF 40.0 TO 44.9 IN ADULT (HCC): Primary | ICD-10-CM

## 2021-06-16 PROCEDURE — 99213 OFFICE O/P EST LOW 20 MIN: CPT | Performed by: NURSE PRACTITIONER

## 2021-06-16 NOTE — PROGRESS NOTES
"Patient Care Team:  Krishna Sanches MD as PCP - General (Family Medicine)    Reason for Visit:  Surgical Weight loss    Subjective   Mary Hunt is a 50 y.o. female.     Mary is here for follow-up and continued medical management of her morbid obesity.  She is currently on a prescription diet.  Mary previously was to apply dietary changes such as following the meal plan as directed.  She admits to eating 4 meals per day.  As a result she lost weight since her last visit.  Admits to drinking 64 ounces of water and 63 grams of protein each day. Denies soda intake and smoking.    Review Of Systems:  Review of Systems   Constitutional: Positive for fatigue.   Respiratory: Negative.    Cardiovascular: Negative.    Gastrointestinal: Positive for constipation.   Endocrine: Negative.    Musculoskeletal: Positive for arthralgias.   Skin:        Rash in skin folds   Psychiatric/Behavioral: Negative.          The following portions of the patient's history were reviewed and updated as appropriate: allergies, current medications, past family history, past medical history, past social history, past surgical history, and problem list.    Objective   /84 (BP Location: Right arm, Patient Position: Sitting, Cuff Size: Adult)   Pulse 90   Temp 97.2 °F (36.2 °C)   Ht 152.4 cm (60\")   Wt 97 kg (213 lb 12.8 oz)   SpO2 97%   BMI 41.75 kg/m²       06/16/21  1416   Weight: 97 kg (213 lb 12.8 oz)       Physical Exam  Vitals reviewed.   Constitutional:       Appearance: She is obese.   Cardiovascular:      Rate and Rhythm: Normal rate and regular rhythm.   Pulmonary:      Effort: Pulmonary effort is normal.   Abdominal:      General: Bowel sounds are normal.   Musculoskeletal:         General: Normal range of motion.   Skin:     General: Skin is warm and dry.   Neurological:      Mental Status: She is alert and oriented to person, place, and time.   Psychiatric:         Mood and Affect: Mood normal.         " Behavior: Behavior normal.         Patient's Body mass index is 41.75 kg/m². indicating that she is morbidly obese (BMI > 40 or > 35 with obesity - related health condition). Obesity-related health conditions include the following: none. Obesity is improving with treatment. BMI is is above average; BMI management plan is completed. We discussed portion control and increasing exercise..     Assessment/Plan     Encounter Diagnoses   Name Primary?   • Class 3 severe obesity due to excess calories with serious comorbidity and body mass index (BMI) of 40.0 to 44.9 in adult (CMS/[facility]) Yes       Mary Hunt was seen today for follow-up, obesity, nutrition counseling and weight loss.  She has lost weight since her last visit.  Today we discussed healthy changes in lifestyle, diet, and exercise. Dietician consultation obtained.  Mary Hunt had received handouts to her explaining the recommendation on portion sizes/appetite control/reading nutrition labels.   Intensive behavioral therapy for obesity was done today as well.     Goals for this month are:   1. Will make psychiatry appointment today   2. Advised to slightly increase protein intake.   3. Continue to avoid soda intake, patient has fully eliminated soda since last appointment.     Follow up in one month for a weight recheck.

## 2021-07-12 ENCOUNTER — TELEPHONE (OUTPATIENT)
Dept: BARIATRICS/WEIGHT MGMT | Facility: CLINIC | Age: 51
End: 2021-07-12

## 2021-07-12 NOTE — TELEPHONE ENCOUNTER
Pt calling to check on pysch referral and if she needs to keep her appt for this week. Let pt know to keep appt and we will work on her referral and get it sent where it needs to go. Pt voiced understanding

## 2021-07-14 ENCOUNTER — TELEPHONE (OUTPATIENT)
Dept: BARIATRICS/WEIGHT MGMT | Facility: CLINIC | Age: 51
End: 2021-07-14

## 2021-07-15 ENCOUNTER — OFFICE VISIT (OUTPATIENT)
Dept: BARIATRICS/WEIGHT MGMT | Facility: CLINIC | Age: 51
End: 2021-07-15

## 2021-07-15 ENCOUNTER — TRANSCRIBE ORDERS (OUTPATIENT)
Dept: ADMINISTRATIVE | Facility: HOSPITAL | Age: 51
End: 2021-07-15

## 2021-07-15 ENCOUNTER — HOSPITAL ENCOUNTER (OUTPATIENT)
Dept: CARDIOLOGY | Facility: HOSPITAL | Age: 51
Discharge: HOME | End: 2021-07-15
Admitting: NURSE PRACTITIONER

## 2021-07-15 VITALS
TEMPERATURE: 97.7 F | OXYGEN SATURATION: 98 % | BODY MASS INDEX: 41.98 KG/M2 | SYSTOLIC BLOOD PRESSURE: 120 MMHG | HEART RATE: 83 BPM | WEIGHT: 213.8 LBS | HEIGHT: 60 IN | DIASTOLIC BLOOD PRESSURE: 74 MMHG

## 2021-07-15 DIAGNOSIS — Z87.19 HISTORY OF ESOPHAGEAL REFLUX: ICD-10-CM

## 2021-07-15 DIAGNOSIS — E66.01 CLASS 3 SEVERE OBESITY DUE TO EXCESS CALORIES WITH SERIOUS COMORBIDITY AND BODY MASS INDEX (BMI) OF 40.0 TO 44.9 IN ADULT (HCC): Primary | ICD-10-CM

## 2021-07-15 DIAGNOSIS — E66.01 MORBID OBESITY (HCC): Primary | ICD-10-CM

## 2021-07-15 PROCEDURE — 93010 ELECTROCARDIOGRAM REPORT: CPT | Performed by: INTERNAL MEDICINE

## 2021-07-15 PROCEDURE — 99214 OFFICE O/P EST MOD 30 MIN: CPT | Performed by: NURSE PRACTITIONER

## 2021-07-15 PROCEDURE — 93005 ELECTROCARDIOGRAM TRACING: CPT

## 2021-07-15 RX ORDER — PAROXETINE HYDROCHLORIDE 20 MG/1
20 TABLET, FILM COATED ORAL EVERY MORNING
COMMUNITY
End: 2021-08-12

## 2021-07-15 NOTE — PROGRESS NOTES
"Patient Care Team:  Krishna Sanches MD as PCP - General (Family Medicine)    Reason for Visit:  Surgical Weight loss    Subjective   Mary Hunt is a 51 y.o. female.     Mary is here for follow-up and continued medical management of her morbid obesity.  She is currently on a prescription diet.  Mary previously was to apply dietary changes such as following the meal plan as directed.  She admits to eating 4 meals per day.  As a result she remained the same weight since her last visit.    Review Of Systems:  Review of Systems   Constitutional: Positive for fatigue.   Respiratory: Negative.    Cardiovascular: Negative.    Gastrointestinal: Negative.    Endocrine: Negative.    Musculoskeletal: Positive for joint swelling.   Psychiatric/Behavioral: Negative.          The following portions of the patient's history were reviewed and updated as appropriate: allergies, current medications, past family history, past medical history, past social history, past surgical history, and problem list.    Objective   /74 (BP Location: Right arm, Patient Position: Sitting, Cuff Size: Adult)   Pulse 83   Temp 97.7 °F (36.5 °C)   Ht 152.4 cm (60\")   Wt 97 kg (213 lb 12.8 oz)   SpO2 98%   BMI 41.75 kg/m²       07/15/21  0917   Weight: 97 kg (213 lb 12.8 oz)       Physical Exam  Vitals reviewed.   Constitutional:       Appearance: She is obese.   Cardiovascular:      Rate and Rhythm: Normal rate and regular rhythm.   Pulmonary:      Effort: Pulmonary effort is normal.   Abdominal:      General: Bowel sounds are normal.      Palpations: Abdomen is soft.   Musculoskeletal:         General: Normal range of motion.   Skin:     General: Skin is warm and dry.   Neurological:      Mental Status: She is alert and oriented to person, place, and time.   Psychiatric:         Mood and Affect: Mood normal.         Behavior: Behavior normal.         Patient's Body mass index is 41.75 kg/m². indicating that she is morbidly " obese (BMI > 40 or > 35 with obesity - related health condition). Obesity-related health conditions include the following: none. Obesity is unchanged. BMI is is above average; BMI management plan is completed. We discussed portion control and increasing exercise..     Assessment/Plan     Encounter Diagnoses   Name Primary?   • Class 3 severe obesity due to excess calories with serious comorbidity and body mass index (BMI) of 40.0 to 44.9 in adult (CMS/[facility]) Yes       Mary Hunt was seen today for follow-up, obesity, nutrition counseling and weight loss.  She has remained the same  weight since her last visit.  Today we discussed healthy changes in lifestyle, diet, and exercise. Dietician consultation obtained.  Mary Hunt had received handouts to her explaining the recommendation on portion sizes/appetite control/reading nutrition labels.   Intensive behavioral therapy for obesity was done today as well.       I believe this patient will be a good candidate for weight loss surgery.  I have discussed the Kayla - Y Gastric Bypass, laparoscopic sleeve gastrectomy and the Laparoscopic Gastric Band procedures.  We discussed the benefits of the surgeries including the benefit of weight loss and the possible reversal of co-morbid conditions associated with morbid obesity. I explained to the patient that prior to making a definitive decision on the type of surgery she will require an esophagogastroduodenoscopy with biopsies to assess for any contraindications for surgical weight loss.  The alternatives  include not doing anything, or pursuing an UGI series which only offers a diagnosis with potential less accuracy compared to EGD. The benefits of the EGD such as identifying the pathology and anatomy of the upper GI system and the complications and risks of the procedure.  The risk of the endoscopy were discussed in detail. We discussed the risk of perforation (one out of 5073-3850, riskier with dilation), bleeding  (one out of 500), and the rare risks of infection, adverse reaction to anesthesia, respiratory failure, cardiac failure including MI and adverse reaction to medications, etc. We discussed consequences that could occur if a risk were to develop such as the need for hospitalization, blood transfusion, surgical intervention, medications, pain, disability and death. The patient verbalizes understanding and agrees to proceed. such as bleeding, perforation, swallowing difficulties and gas bloat can occur after this procedure.  Upon completion of our discussion and addressing and answering her questions to her satisfation, informed consent was obtained.  She will be scheduled accordingly for the esophagogastroduodenoscopy procedure.    Goals for this month are:   1.  Patient advised to focus on snacking.  Patient is snacking throughout the day and snacking at night.  Patient states that she is eating baked chips and not eating her free foods plants and choosing her snacks.  I reviewed prescription meal plan with patient and encouraged her to focus on the free foods.  Patient verbalizes understanding.  Patient did bring in a food journal for me to review today.  Food journal looks within normal limits but no snacking was locked.  2. EGD discussed with patient today.  She states she has no questions and verbalizes risk and benefits.    Follow up in one month for a weight recheck.

## 2021-07-17 LAB
QT INTERVAL: 418 MS
QTC INTERVAL: 444 MS

## 2021-08-05 PROBLEM — Z87.19 HISTORY OF ESOPHAGEAL REFLUX: Status: ACTIVE | Noted: 2021-08-05

## 2021-08-12 ENCOUNTER — NUTRITION (OUTPATIENT)
Dept: BARIATRICS/WEIGHT MGMT | Facility: HOSPITAL | Age: 51
End: 2021-08-12

## 2021-08-12 VITALS — HEIGHT: 60 IN | BODY MASS INDEX: 42.37 KG/M2 | WEIGHT: 215.8 LBS

## 2021-08-12 NOTE — PROGRESS NOTES
Nutrition Services    Patient Name:  Mary Hunt  YOB: 1970  MRN: [id number]  Admit Date:  (Not on file)    NUTRITION BARIATRIC/M[facility] NOTE     Visit- 4   Initial Assessment   BA#   M[facility]    Anthropometrics   Height: 60 in  Weight: 215 lbs 12.8 oz  BMI: 42.15    Nutrition Recall  Eating __4____ meals daily-usually   Protein lacking at  Snacking-some  Making healthier choices  Limited sweet intake-fruit  Monitoring portions  Drinking carbonated beverages-none  Drinking greater to or equal to 64 fluid ounces    Education  Review of 4 meal per day diet plan    Reinforce Nutritional Needs for Surgery  Reinforce Nutritional Needs for MWL    Nutrition Goals   Continue diet changes  Eat ___4__ meals per day with protein  Protein goal: 65gms   discussed protein guidelines for shakes and bar  Eliminate snacks  Healthier food choices  Portion control / Use smaller plate or measuring cup   Replace sugar beverages with artifical sweetened   Increase fluid intake to 64 ounces per day      Electronically signed by:  Anaid Mcbride  08/12/21 10:59 CDT

## 2021-08-16 ENCOUNTER — TRANSCRIBE ORDERS (OUTPATIENT)
Dept: LAB | Facility: HOSPITAL | Age: 51
End: 2021-08-16

## 2021-08-16 DIAGNOSIS — Z01.818 PREOPERATIVE TESTING: Primary | ICD-10-CM

## 2021-08-17 ENCOUNTER — TELEPHONE (OUTPATIENT)
Dept: BARIATRICS/WEIGHT MGMT | Facility: CLINIC | Age: 51
End: 2021-08-17

## 2021-08-17 NOTE — TELEPHONE ENCOUNTER
BA-EGD       Patient was called and reminded of their procedure with Dr Oh on 08/20/2021        Instructions:     1) Eat normal the day before your procedure until 8 p.m. in the evening.    2) Beginning at 8pm clear liquids only-no Red or Pink in Color   3) Nothing to eat or drink after midnight.  4) Bring a list of medications.   5) Advised that they must bring a  as that IV sedation is being used.           The patient voiced an understanding and was agreeable.

## 2021-08-18 ENCOUNTER — LAB (OUTPATIENT)
Dept: LAB | Facility: HOSPITAL | Age: 51
End: 2021-08-18

## 2021-08-18 LAB — SARS-COV-2 ORF1AB RESP QL NAA+PROBE: NOT DETECTED

## 2021-08-18 PROCEDURE — C9803 HOPD COVID-19 SPEC COLLECT: HCPCS | Performed by: SURGERY

## 2021-08-18 PROCEDURE — U0004 COV-19 TEST NON-CDC HGH THRU: HCPCS | Performed by: SURGERY

## 2021-09-10 ENCOUNTER — TELEPHONE (OUTPATIENT)
Dept: BARIATRICS/WEIGHT MGMT | Facility: CLINIC | Age: 51
End: 2021-09-10

## 2021-09-10 NOTE — TELEPHONE ENCOUNTER
BA-EGD       Patient was called and reminded of their procedure with Dr Oh on 09/17/2021        Instructions:     1) Eat normal the day before your procedure until 8 p.m. in the evening.    2) Beginning at 8pm clear liquids only-no Red or Pink in Color   3) Nothing to eat or drink after midnight.  4) Bring a list of medications.   5) Advised that they must bring a  as that IV sedation is being used.           The patient voiced an understanding and was agreeable.

## 2021-09-13 ENCOUNTER — TRANSCRIBE ORDERS (OUTPATIENT)
Dept: LAB | Facility: HOSPITAL | Age: 51
End: 2021-09-13

## 2021-09-13 ENCOUNTER — OFFICE VISIT (OUTPATIENT)
Dept: BARIATRICS/WEIGHT MGMT | Facility: CLINIC | Age: 51
End: 2021-09-13

## 2021-09-13 VITALS
HEART RATE: 88 BPM | BODY MASS INDEX: 42.33 KG/M2 | SYSTOLIC BLOOD PRESSURE: 121 MMHG | HEIGHT: 60 IN | OXYGEN SATURATION: 98 % | TEMPERATURE: 98.6 F | DIASTOLIC BLOOD PRESSURE: 76 MMHG | WEIGHT: 215.6 LBS

## 2021-09-13 DIAGNOSIS — E66.01 CLASS 3 SEVERE OBESITY DUE TO EXCESS CALORIES WITH SERIOUS COMORBIDITY AND BODY MASS INDEX (BMI) OF 40.0 TO 44.9 IN ADULT (HCC): Primary | ICD-10-CM

## 2021-09-13 DIAGNOSIS — Z01.818 PREOPERATIVE TESTING: Primary | ICD-10-CM

## 2021-09-13 PROCEDURE — 99213 OFFICE O/P EST LOW 20 MIN: CPT | Performed by: NURSE PRACTITIONER

## 2021-09-13 NOTE — H&P (VIEW-ONLY)
"Patient Care Team:  Krishna Sanches MD as PCP - General (Family Medicine)    Reason for Visit:  Surgical Weight loss    Subjective   Mary Hunt is a 51 y.o. female.     Mary is here for follow-up and continued medical management of her morbid obesity.  She is currently on a prescription diet.  Mary previously was to apply dietary changes such as following the meal plan as directed.  She admits to eating 3-4 meals per day.  As a result she remained the same weight since her last visit.  She states that she has some personal stressors recently that has caused her to snack often and not follow the prescription meal plan.   She denies smoking or soda intake and states she is drinking at least 64 ounces of water each day.    Review Of Systems:  Review of Systems   Constitutional: Negative.    Respiratory: Negative.    Cardiovascular: Negative.    Gastrointestinal: Negative.    Endocrine: Negative.    Genitourinary: Positive for frequency.   Musculoskeletal: Negative.    Psychiatric/Behavioral: Negative.      The following portions of the patient's history were reviewed and updated as appropriate: allergies, current medications, past family history, past medical history, past social history, past surgical history, and problem list.    Objective   /76 (BP Location: Right arm, Patient Position: Sitting, Cuff Size: Adult)   Pulse 88   Temp 98.6 °F (37 °C)   Ht 152.4 cm (60\")   Wt 97.8 kg (215 lb 9.6 oz)   SpO2 98%   BMI 42.11 kg/m²       09/13/21  1310   Weight: 97.8 kg (215 lb 9.6 oz)       Physical Exam  Vitals reviewed.   Constitutional:       Appearance: She is obese.   Cardiovascular:      Rate and Rhythm: Normal rate and regular rhythm.   Pulmonary:      Effort: Pulmonary effort is normal.   Musculoskeletal:         General: Normal range of motion.   Skin:     General: Skin is warm and dry.   Neurological:      Mental Status: She is alert and oriented to person, place, and time. "   Psychiatric:         Mood and Affect: Mood normal.         Behavior: Behavior normal.         Patient's Body mass index is 42.11 kg/m². indicating that she is morbidly obese (BMI > 40 or > 35 with obesity - related health condition). Obesity-related health conditions include the following: none. Obesity is unchanged. BMI is is above average; BMI management plan is completed. We discussed portion control and increasing exercise..     Assessment/Plan   Diagnoses and all orders for this visit:    1. Class 3 severe obesity due to excess calories with serious comorbidity and body mass index (BMI) of 40.0 to 44.9 in adult (CMS/[facility]) (Primary)  Assessment & Plan:  Patient's (Body mass index is 42.11 kg/m².) indicates that they are morbidly obese (BMI > 40 or > 35 with obesity - related health condition) with health conditions that include none . Weight is unchanged. BMI is is above average; BMI management plan is completed. We discussed portion control and increasing exercise.          Mary Hunt was seen today for follow-up, obesity, nutrition counseling and weight loss.  She has remained the same weight since her last visit.  Today we discussed healthy changes in lifestyle, diet, and exercise. Dietician consultation obtained.  Mary Hunt had received handouts to her explaining the recommendation on portion sizes/appetite control/reading nutrition labels.   Intensive behavioral therapy for obesity was done today as well.     Goals for this month are:   1.  Follow prescription meal plan eat 4 meals per day.  Meal plan reviewed with patient.  2.  Patient encouraged to utilize free foods when snacking.  3.  Patient has struggled getting connected with psychiatry therefore she has not had her appointment.  I am going to speak with the psychiatric office and have patient do her virtual visit at our facility.  Patient has been advised to discuss coping mechanisms with psychiatry.  EGD is scheduled, patient states  that she has no questions about procedure    Follow up in one month for a weight recheck.A total of 20 minutes was spent face to face with this patient and over half of the time was spent on counseling and coordination of care for the disease of obesity. We specifically reviewed the dietary prescription and I made recommendations toward increasing exercise as tolerated as well as focusing on training their behavior toward storing less.

## 2021-09-13 NOTE — ASSESSMENT & PLAN NOTE
Patient's (Body mass index is 42.11 kg/m².) indicates that they are morbidly obese (BMI > 40 or > 35 with obesity - related health condition) with health conditions that include none . Weight is unchanged. BMI is is above average; BMI management plan is completed. We discussed portion control and increasing exercise.

## 2021-09-13 NOTE — PROGRESS NOTES
"Patient Care Team:  Krishna Sanches MD as PCP - General (Family Medicine)    Reason for Visit:  Surgical Weight loss    Subjective   Mary Hunt is a 51 y.o. female.     Mary is here for follow-up and continued medical management of her morbid obesity.  She is currently on a prescription diet.  Mary previously was to apply dietary changes such as following the meal plan as directed.  She admits to eating 3-4 meals per day.  As a result she remained the same weight since her last visit.  She states that she has some personal stressors recently that has caused her to snack often and not follow the prescription meal plan.   She denies smoking or soda intake and states she is drinking at least 64 ounces of water each day.    Review Of Systems:  Review of Systems   Constitutional: Negative.    Respiratory: Negative.    Cardiovascular: Negative.    Gastrointestinal: Negative.    Endocrine: Negative.    Genitourinary: Positive for frequency.   Musculoskeletal: Negative.    Psychiatric/Behavioral: Negative.      The following portions of the patient's history were reviewed and updated as appropriate: allergies, current medications, past family history, past medical history, past social history, past surgical history, and problem list.    Objective   /76 (BP Location: Right arm, Patient Position: Sitting, Cuff Size: Adult)   Pulse 88   Temp 98.6 °F (37 °C)   Ht 152.4 cm (60\")   Wt 97.8 kg (215 lb 9.6 oz)   SpO2 98%   BMI 42.11 kg/m²       09/13/21  1310   Weight: 97.8 kg (215 lb 9.6 oz)       Physical Exam  Vitals reviewed.   Constitutional:       Appearance: She is obese.   Cardiovascular:      Rate and Rhythm: Normal rate and regular rhythm.   Pulmonary:      Effort: Pulmonary effort is normal.   Musculoskeletal:         General: Normal range of motion.   Skin:     General: Skin is warm and dry.   Neurological:      Mental Status: She is alert and oriented to person, place, and time. "   Psychiatric:         Mood and Affect: Mood normal.         Behavior: Behavior normal.         Patient's Body mass index is 42.11 kg/m². indicating that she is morbidly obese (BMI > 40 or > 35 with obesity - related health condition). Obesity-related health conditions include the following: none. Obesity is unchanged. BMI is is above average; BMI management plan is completed. We discussed portion control and increasing exercise..     Assessment/Plan   Diagnoses and all orders for this visit:    1. Class 3 severe obesity due to excess calories with serious comorbidity and body mass index (BMI) of 40.0 to 44.9 in adult (CMS/[facility]) (Primary)  Assessment & Plan:  Patient's (Body mass index is 42.11 kg/m².) indicates that they are morbidly obese (BMI > 40 or > 35 with obesity - related health condition) with health conditions that include none . Weight is unchanged. BMI is is above average; BMI management plan is completed. We discussed portion control and increasing exercise.          Mary Hunt was seen today for follow-up, obesity, nutrition counseling and weight loss.  She has remained the same weight since her last visit.  Today we discussed healthy changes in lifestyle, diet, and exercise. Dietician consultation obtained.  Mary Hunt had received handouts to her explaining the recommendation on portion sizes/appetite control/reading nutrition labels.   Intensive behavioral therapy for obesity was done today as well.     Goals for this month are:   1.  Follow prescription meal plan eat 4 meals per day.  Meal plan reviewed with patient.  2.  Patient encouraged to utilize free foods when snacking.  3.  Patient has struggled getting connected with psychiatry therefore she has not had her appointment.  I am going to speak with the psychiatric office and have patient do her virtual visit at our facility.  Patient has been advised to discuss coping mechanisms with psychiatry.  EGD is scheduled, patient states  that she has no questions about procedure    Follow up in one month for a weight recheck.A total of 20 minutes was spent face to face with this patient and over half of the time was spent on counseling and coordination of care for the disease of obesity. We specifically reviewed the dietary prescription and I made recommendations toward increasing exercise as tolerated as well as focusing on training their behavior toward storing less.

## 2021-09-15 ENCOUNTER — LAB (OUTPATIENT)
Dept: LAB | Facility: HOSPITAL | Age: 51
End: 2021-09-15

## 2021-09-15 LAB — SARS-COV-2 ORF1AB RESP QL NAA+PROBE: NOT DETECTED

## 2021-09-15 PROCEDURE — C9803 HOPD COVID-19 SPEC COLLECT: HCPCS | Performed by: SURGERY

## 2021-09-15 PROCEDURE — U0004 COV-19 TEST NON-CDC HGH THRU: HCPCS | Performed by: SURGERY

## 2021-09-17 ENCOUNTER — HOSPITAL ENCOUNTER (OUTPATIENT)
Facility: HOSPITAL | Age: 51
Setting detail: HOSPITAL OUTPATIENT SURGERY
Discharge: HOME | End: 2021-09-17
Attending: SURGERY | Admitting: SURGERY

## 2021-09-17 ENCOUNTER — ANESTHESIA (OUTPATIENT)
Dept: GASTROENTEROLOGY | Facility: HOSPITAL | Age: 51
End: 2021-09-17

## 2021-09-17 ENCOUNTER — ANESTHESIA EVENT (OUTPATIENT)
Dept: GASTROENTEROLOGY | Facility: HOSPITAL | Age: 51
End: 2021-09-17

## 2021-09-17 VITALS
OXYGEN SATURATION: 100 % | WEIGHT: 214 LBS | RESPIRATION RATE: 17 BRPM | HEIGHT: 57 IN | HEART RATE: 75 BPM | BODY MASS INDEX: 46.17 KG/M2 | DIASTOLIC BLOOD PRESSURE: 74 MMHG | SYSTOLIC BLOOD PRESSURE: 118 MMHG | TEMPERATURE: 97.2 F

## 2021-09-17 DIAGNOSIS — Z87.19 HISTORY OF ESOPHAGEAL REFLUX: ICD-10-CM

## 2021-09-17 DIAGNOSIS — E66.01 CLASS 3 SEVERE OBESITY DUE TO EXCESS CALORIES WITH SERIOUS COMORBIDITY AND BODY MASS INDEX (BMI) OF 40.0 TO 44.9 IN ADULT (HCC): ICD-10-CM

## 2021-09-17 LAB — TSH SERPL DL<=0.05 MIU/L-ACNC: 1.31 UIU/ML (ref 0.27–4.2)

## 2021-09-17 PROCEDURE — 87081 CULTURE SCREEN ONLY: CPT | Performed by: SURGERY

## 2021-09-17 PROCEDURE — 84443 ASSAY THYROID STIM HORMONE: CPT | Performed by: NURSE PRACTITIONER

## 2021-09-17 PROCEDURE — 25010000002 PROPOFOL 10 MG/ML EMULSION: Performed by: NURSE ANESTHETIST, CERTIFIED REGISTERED

## 2021-09-17 RX ORDER — LIDOCAINE HYDROCHLORIDE 20 MG/ML
INJECTION, SOLUTION EPIDURAL; INFILTRATION; INTRACAUDAL; PERINEURAL AS NEEDED
Status: DISCONTINUED | OUTPATIENT
Start: 2021-09-17 | End: 2021-09-17 | Stop reason: SURG

## 2021-09-17 RX ORDER — SODIUM CHLORIDE 9 MG/ML
500 INJECTION, SOLUTION INTRAVENOUS CONTINUOUS PRN
Status: DISCONTINUED | OUTPATIENT
Start: 2021-09-17 | End: 2021-09-17 | Stop reason: HOSPADM

## 2021-09-17 RX ORDER — PROPOFOL 10 MG/ML
VIAL (ML) INTRAVENOUS AS NEEDED
Status: DISCONTINUED | OUTPATIENT
Start: 2021-09-17 | End: 2021-09-17 | Stop reason: SURG

## 2021-09-17 RX ORDER — SODIUM CHLORIDE 0.9 % (FLUSH) 0.9 %
10 SYRINGE (ML) INJECTION AS NEEDED
Status: DISCONTINUED | OUTPATIENT
Start: 2021-09-17 | End: 2021-09-17 | Stop reason: HOSPADM

## 2021-09-17 RX ADMIN — LIDOCAINE HYDROCHLORIDE 100 MG: 20 INJECTION, SOLUTION EPIDURAL; INFILTRATION; INTRACAUDAL; PERINEURAL at 08:45

## 2021-09-17 RX ADMIN — PROPOFOL 70 MG: 10 INJECTION, EMULSION INTRAVENOUS at 08:45

## 2021-09-17 RX ADMIN — PROPOFOL 40 MG: 10 INJECTION, EMULSION INTRAVENOUS at 08:46

## 2021-09-17 RX ADMIN — SODIUM CHLORIDE 500 ML: 9 INJECTION, SOLUTION INTRAVENOUS at 07:45

## 2021-09-17 NOTE — ANESTHESIA PREPROCEDURE EVALUATION
Anesthesia Evaluation     Patient summary reviewed   no history of anesthetic complications:  NPO Solid Status: > 8 hours             Airway   Mallampati: II  Dental      Pulmonary - negative pulmonary ROS   Cardiovascular - negative cardio ROS  Exercise tolerance: excellent (>7 METS)        Neuro/Psych- negative ROS    ROS Comment: Hydrocephalus s/p VPS  GI/Hepatic/Renal/Endo    (+) morbid obesity,      Musculoskeletal     Abdominal    Substance History      OB/GYN          Other                        Anesthesia Plan    ASA 3     MAC       Anesthetic plan, all risks, benefits, and alternatives have been provided, discussed and informed consent has been obtained with: patient.

## 2021-09-17 NOTE — ANESTHESIA POSTPROCEDURE EVALUATION
Patient: Mary Hunt    Procedure Summary     Date: 09/17/21 Room / Location: [facility] ENDOSCOPY 2 / [facility] ENDOSCOPY    Anesthesia Start: 0842 Anesthesia Stop:     Procedure: ESOPHAGOGASTRODUODENOSCOPY WITH ANESTHESIA (N/A ) Diagnosis:       Class 3 severe obesity due to excess calories with serious comorbidity and body mass index (BMI) of 40.0 to 44.9 in adult (CMS/[facility])      History of esophageal reflux      (Class 3 severe obesity due to excess calories with serious comorbidity and body mass index (BMI) of 40.0 to 44.9 in adult (CMS/[facility]) [E66.01, Z68.41])      (History of esophageal reflux [Z87.19])    Surgeons: Matthew Oh MD Provider: Delmar Oh CRNA    Anesthesia Type: MAC ASA Status: 3          Anesthesia Type: MAC    Vitals  No vitals data found for the desired time range.          Post Anesthesia Care and Evaluation    Patient location during evaluation: PHASE II  Patient participation: complete - patient participated  Level of consciousness: awake  Pain score: 0  Pain management: adequate  Airway patency: patent  Anesthetic complications: No anesthetic complications  PONV Status: none  Cardiovascular status: acceptable  Respiratory status: acceptable  Hydration status: acceptable

## 2021-09-17 NOTE — NURSING NOTE
"0855 pt found tooth in mouth. States \"was loose prior to procedure and that she probably did it\"   JOSE A Oh CRNA notified, no more missing teeth found as he removed bite block.  "

## 2021-09-18 LAB — UREASE TISS QL: NEGATIVE

## 2021-10-12 ENCOUNTER — OFFICE VISIT (OUTPATIENT)
Dept: BARIATRICS/WEIGHT MGMT | Facility: CLINIC | Age: 51
End: 2021-10-12

## 2021-10-12 VITALS
OXYGEN SATURATION: 100 % | WEIGHT: 216.13 LBS | TEMPERATURE: 98.4 F | DIASTOLIC BLOOD PRESSURE: 70 MMHG | HEIGHT: 60 IN | BODY MASS INDEX: 42.43 KG/M2 | SYSTOLIC BLOOD PRESSURE: 115 MMHG | RESPIRATION RATE: 18 BRPM | HEART RATE: 70 BPM

## 2021-10-12 DIAGNOSIS — E66.01 CLASS 3 SEVERE OBESITY DUE TO EXCESS CALORIES WITH SERIOUS COMORBIDITY AND BODY MASS INDEX (BMI) OF 40.0 TO 44.9 IN ADULT (HCC): Primary | ICD-10-CM

## 2021-10-12 DIAGNOSIS — Z87.19 HISTORY OF ESOPHAGEAL REFLUX: ICD-10-CM

## 2021-10-12 PROCEDURE — 99213 OFFICE O/P EST LOW 20 MIN: CPT | Performed by: SURGERY

## 2021-10-12 NOTE — PROGRESS NOTES
"   Patient Care Team:  Krishna Sanches MD as PCP - General (Family Medicine)    Reason for Visit:  Surgical Weight loss    Subjective      Mary Hunt is a pleasant 51 y.o. female and presents with morbid obesity with her Body mass index is 42.21 kg/m².    She is here for discussion of weight loss options.  She stated she has been with the disease of obesity for {duration:70454}.  She stated she suffers from *** and morbid obesity due to her weight gain.  She stated that *** helps alleviate these symptoms.   She stated that she has tried *** to help with weight loss.  She stated that she has attempted these conservative methods for weight loss without maintaining long term success.  Today she would like to discuss surgical weight loss options such as the Laparoscopic Sleeve Gastrectomy or the Laparoscopic R - Y Gastric Bypass.      Review of Systems  {ros master:743214}    History  {History:41342}    No current outpatient medications on file.    Objective     Vital Signs  Temp:  [98.4 °F (36.9 °C)] 98.4 °F (36.9 °C)  Heart Rate:  [70] 70  Resp:  [18] 18  BP: (115)/(70) 115/70  Body mass index is 42.21 kg/m².      10/12/21  1323   Weight: 98 kg (216 lb 2 oz)       {PHYSICAL EXAM WITH PROVIDER CHOICES:76032}      Results Review:   {Results Review:85818::\"I reviewed the patient's new clinical results.\"}        Assessment/Plan   Encounter Diagnoses   Name Primary?   • Class 3 severe obesity due to excess calories with serious comorbidity and body mass index (BMI) of 40.0 to 44.9 in adult (HCC) Yes   • History of esophageal reflux        I believe this patient will be a good candidate for weight loss surgery.    She has chosen laparoscopic sleeve gastrectomy. I agree with this decision.  I have discussed the Kayla - Y Gastric Bypass, laparoscopic sleeve gastrectomy and the Laparoscopic Gastric Band procedures to provide the alternatives which includes non surgical weight loss options as well.  We discussed the " benefits of the surgeries including the benefit of weight loss and the possible reversal of co-morbid conditions associated with morbid obesity.  She is aware that the Sleeve procedure is not reversible.  We discussed the complications and risks which include the risk of perforation, leakage,bleeding, intra-abdominal organ injury, specifically at high risks of the liver and spleen, stenosis or ulcerations, the risk of venous thrombosis formation in the lungs, mesentery veins or lower extremities  leading to possible organ injury and death.  I explained to the patient that there is a risk of infection.  I explained to her the possibility that this procedure may not be performed laparoscopically and may require being converted to an opened procedure or aborted due to abnormal anatomy.  Also postoperatively there is a risk of increased GERD symptoms after this procedure.  I have explained if she develops intestinal metaplasia of her esophagus consideration for conversion to another weight loss procedure may be necessary.    I have reviewed with the patient her 30-day mortality risk using the Bariatric Surgical Risk/Benefit Calculator to be 0.08%.    Zaida Report   As part of this patient's treatment plan I am prescribing controlled substances. The patient has been made aware of appropriate use of such medications, including potential risk of somnolence, limited ability to drive and /or work safely, and potential for dependence or overdose. It has also been made clear that these medications are for use by this patient only, without concomitant use of alcohol or other substances unless prescribed.    Mary Hunt will be required to complete the educational preoperative class detailing terms of the preoperative and postoperative recommendations, risks of surgery, the monitoring of the patient's ZAIDA reports if necessary. Mary Hunt is aware that inappropriate use of prescribed medications will result in  cessation of prescribing such medications.    ZAIDA report has been reviewed.      History and physical exam exhibit continued safe and appropriate use of controlled substances.       Mary Hunt understands the surgical procedures and the different surgical options that are available.   Mary Hunt understands the lifestyle changes that are required after surgery and has agreed to follow the guidelines outlined in the weight management program. Mary Hunt also expressed understanding of the risks involved and had all of her questions answered and desires to proceed.    Upon completion of our discussion and addressing and answering her questions to her satisfaction, informed consent was obtained.   She will be scheduled accordingly for a laparoscopic Sleeve gastrectomy procedure.      I discussed the patient's findings and my recommendations with patient.     I have also recommended that she obtain *** prior to surgery consideration.      Dr. Matthew Oh MD [facility]    10/12/21  13:33 CDT  Patient Care Team:  Krishna Sanches MD as PCP - General (Family Medicine)

## 2021-10-12 NOTE — PROGRESS NOTES
"Patient Care Team:  Krishna Sanches MD as PCP - General (Family Medicine)    Reason for Visit:  Surgical Weight loss      Subjective   Mary Hunt is a 51 y.o. female.     Mary is here for follow-up and continued medical management of her morbid obesity.  She is currently on a prescription diet.  Mary previously was to apply dietary changes such as following the meal plan as directed.  She admits to struggling to follow the dietary prescription consistently.  As a result she gained weight since her last visit.    Review Of Systems:  General ROS: positive for  - fatigue  Respiratory ROS: no cough, shortness of breath, or wheezing  Cardiovascular ROS: no chest pain or dyspnea on exertion  positive for - edema  Gastrointestinal ROS: no abdominal pain, change in bowel habits, or black or bloody stools    The following portions of the patient's history were reviewed and updated as appropriate: allergies, current medications, past family history, past medical history, past social history, past surgical history and problem list.    Objective   /70 (BP Location: Right arm, Patient Position: Sitting, Cuff Size: Large Adult)   Pulse 70   Temp 98.4 °F (36.9 °C) (Infrared)   Resp 18   Ht 152.4 cm (60\")   Wt 98 kg (216 lb 2 oz)   SpO2 100%   BMI 42.21 kg/m²       10/12/21  1323   Weight: 98 kg (216 lb 2 oz)       General Appearance:  awake, alert, oriented, in no acute distress    Assessment/Plan     Encounter Diagnoses   Name Primary?   • Class 3 severe obesity due to excess calories with serious comorbidity and body mass index (BMI) of 40.0 to 44.9 in adult ([facility]) Yes   • History of esophageal reflux        Mary Hunt was seen today for follow-up, obesity, nutrition counseling and weight loss.  She has gained weight since her last visit.  Today we discussed healthy changes in lifestyle, diet, and exercise. Dietician consultation obtained.  Mary Hunt had received handouts to her " explaining the recommendation on portion sizes/appetite control/reading nutrition labels.   Intensive behavioral therapy for obesity was done today as well.   Goals for this month are: She is to follow the dietary prescription as directed.  She will visit with her nurse practitioner for continued medical weight loss and possibly submitted for surgery in the coming months.  A total of 20 minutes was spent face-to-face with this patient during this encounter and over half the time was spent on counseling and coordination of care for morbid obesity.    Follow up in one month for a weight recheck.

## 2021-11-23 ENCOUNTER — OFFICE VISIT (OUTPATIENT)
Dept: BARIATRICS/WEIGHT MGMT | Facility: CLINIC | Age: 51
End: 2021-11-23

## 2021-11-23 VITALS
BODY MASS INDEX: 42.41 KG/M2 | SYSTOLIC BLOOD PRESSURE: 138 MMHG | HEIGHT: 60 IN | WEIGHT: 216 LBS | TEMPERATURE: 98 F | DIASTOLIC BLOOD PRESSURE: 85 MMHG | HEART RATE: 100 BPM | OXYGEN SATURATION: 98 %

## 2021-11-23 DIAGNOSIS — E66.01 CLASS 3 SEVERE OBESITY DUE TO EXCESS CALORIES WITH SERIOUS COMORBIDITY AND BODY MASS INDEX (BMI) OF 40.0 TO 44.9 IN ADULT (HCC): Primary | ICD-10-CM

## 2021-11-23 PROCEDURE — 99213 OFFICE O/P EST LOW 20 MIN: CPT | Performed by: NURSE PRACTITIONER

## 2021-11-23 RX ORDER — CYCLOBENZAPRINE HCL 5 MG
5 TABLET ORAL 2 TIMES DAILY PRN
COMMUNITY

## 2021-11-23 NOTE — PROGRESS NOTES
"Patient Care Team:  Krishna Sanches MD as PCP - General (Family Medicine)    Reason for Visit:  Surgical Weight loss    Subjective   Mary Hunt is a 51 y.o. female.     Mary is here for follow-up and continued medical management of her morbid obesity.  She is currently on a prescription diet.  Mary previously was to apply dietary changes such as following the meal plan as directed.  She admits to eating 4 meals per day.  As a result she did not lose weight since her last visit.    Review Of Systems:  Review of Systems   Constitutional: Positive for fatigue.   Respiratory: Negative.    Cardiovascular: Negative.    Gastrointestinal: Negative.    Endocrine: Negative.    Genitourinary: Positive for frequency.   Musculoskeletal: Negative.    Psychiatric/Behavioral: Negative.        The following portions of the patient's history were reviewed and updated as appropriate: allergies, current medications, past family history, past medical history, past social history, past surgical history, and problem list.    Objective   /85 (BP Location: Right arm, Patient Position: Sitting, Cuff Size: Adult)   Pulse 100   Temp 98 °F (36.7 °C)   Ht 152.4 cm (60\")   Wt 98 kg (216 lb)   SpO2 98%   BMI 42.18 kg/m²       11/23/21  1320   Weight: 98 kg (216 lb)       Physical Exam  Vitals reviewed.   Constitutional:       Appearance: She is obese.   Cardiovascular:      Rate and Rhythm: Normal rate and regular rhythm.   Pulmonary:      Effort: Pulmonary effort is normal.   Abdominal:      General: Bowel sounds are normal.      Palpations: Abdomen is soft.   Musculoskeletal:         General: Normal range of motion.   Skin:     General: Skin is warm and dry.   Neurological:      Mental Status: She is alert and oriented to person, place, and time.   Psychiatric:         Mood and Affect: Mood normal.         Behavior: Behavior normal.         Patient's Body mass index is 42.18 kg/m². indicating that she is morbidly " obese (BMI > 40 or > 35 with obesity - related health condition). Obesity-related health conditions include the following: none. Obesity is improving with treatment. BMI is is above average; BMI management plan is completed. We discussed portion control and increasing exercise..     Assessment/Plan   Diagnoses and all orders for this visit:    1. Class 3 severe obesity due to excess calories with serious comorbidity and body mass index (BMI) of 40.0 to 44.9 in adult (HCC) (Primary)         Mary Hunt was seen today for follow-up, obesity, nutrition counseling and weight loss.  She has not lost weight since her last visit.  Today we discussed healthy changes in lifestyle, diet, and exercise. Dietician consultation obtained.  Mary Hunt had received handouts to her explaining the recommendation on portion sizes/appetite control/reading nutrition labels.   Intensive behavioral therapy for obesity was done today as well.     Goals for this month are:   1. I reviewed patient's food journal and have advised her to avoid group C after her second meal and increase vegetable intake.  Patient verbalizes understanding.  Patient is also snacking on chips throughout the day so she has been advised to fully eliminate snacking on chips.      Follow up in one month for a weight recheck and to discuss scheduling a sleeve gastrectomy with Dr. Oh.

## 2021-12-20 ENCOUNTER — OFFICE VISIT (OUTPATIENT)
Dept: BARIATRICS/WEIGHT MGMT | Facility: CLINIC | Age: 51
End: 2021-12-20

## 2021-12-20 VITALS
BODY MASS INDEX: 42.64 KG/M2 | SYSTOLIC BLOOD PRESSURE: 159 MMHG | WEIGHT: 217.2 LBS | HEIGHT: 60 IN | OXYGEN SATURATION: 98 % | TEMPERATURE: 98 F | HEART RATE: 96 BPM | DIASTOLIC BLOOD PRESSURE: 89 MMHG

## 2021-12-20 DIAGNOSIS — E66.01 CLASS 3 SEVERE OBESITY DUE TO EXCESS CALORIES WITH SERIOUS COMORBIDITY AND BODY MASS INDEX (BMI) OF 40.0 TO 44.9 IN ADULT (HCC): Primary | ICD-10-CM

## 2021-12-20 PROCEDURE — 99213 OFFICE O/P EST LOW 20 MIN: CPT | Performed by: SURGERY
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
## 2022-01-17 ENCOUNTER — OFFICE VISIT (OUTPATIENT)
Dept: BARIATRICS/WEIGHT MGMT | Facility: CLINIC | Age: 52
End: 2022-01-17

## 2022-01-17 VITALS
OXYGEN SATURATION: 98 % | BODY MASS INDEX: 42.05 KG/M2 | TEMPERATURE: 98 F | SYSTOLIC BLOOD PRESSURE: 130 MMHG | HEART RATE: 97 BPM | WEIGHT: 214.2 LBS | HEIGHT: 60 IN | DIASTOLIC BLOOD PRESSURE: 83 MMHG

## 2022-01-17 DIAGNOSIS — Z98.2 VP (VENTRICULOPERITONEAL) SHUNT STATUS: ICD-10-CM

## 2022-01-17 DIAGNOSIS — E66.01 CLASS 3 SEVERE OBESITY DUE TO EXCESS CALORIES WITH SERIOUS COMORBIDITY AND BODY MASS INDEX (BMI) OF 40.0 TO 44.9 IN ADULT: Primary | ICD-10-CM

## 2022-01-17 DIAGNOSIS — K44.9 HIATAL HERNIA: ICD-10-CM

## 2022-01-17 PROCEDURE — 99214 OFFICE O/P EST MOD 30 MIN: CPT | Performed by: SURGERY

## 2022-01-17 RX ORDER — APREPITANT 40 MG/1
40 CAPSULE ORAL DAILY
Qty: 1 CAPSULE | Refills: 0 | Status: ON HOLD | OUTPATIENT
Start: 2022-01-17 | End: 2022-02-10

## 2022-01-17 RX ORDER — SCOLOPAMINE TRANSDERMAL SYSTEM 1 MG/1
1 PATCH, EXTENDED RELEASE TRANSDERMAL ONCE
Status: CANCELLED | OUTPATIENT
Start: 2022-01-17 | End: 2022-01-17

## 2022-01-17 RX ORDER — GABAPENTIN 250 MG/5ML
250 SOLUTION ORAL ONCE
Status: CANCELLED | OUTPATIENT
Start: 2022-01-17 | End: 2022-01-17

## 2022-01-17 NOTE — PROGRESS NOTES
Patient Care Team:  Krishna Sanches MD as PCP - General (Family Medicine)    Reason for Visit:  Surgical Weight loss    Subjective      Mary Hunt is a pleasant 51 y.o. female and presents with morbid obesity with her Body mass index is 41.83 kg/m².    She is here for discussion of weight loss options.  She stated she has been with the disease of obesity for year(s).  She stated she suffers from GERD and morbid obesity due to her weight gain.  She stated that weight loss helps alleviate these symptoms.   She stated that she has tried notable diet regimens to help with weight loss.  She stated that she has attempted these conservative methods for weight loss without maintaining long term success.  Today she would like to discuss surgical weight loss options such as the Laparoscopic Sleeve Gastrectomy or the Laparoscopic R - Y Gastric Bypass.      Review of Systems  General ROS: negative  Respiratory ROS: no cough, shortness of breath, or wheezing  Cardiovascular ROS: no chest pain or dyspnea on exertion  Gastrointestinal ROS: no abdominal pain, change in bowel habits, or black or bloody stools  Genito-Urinary ROS: no dysuria, trouble voiding, or hematuria  positive for - urinary frequency/urgency  Musculoskeletal ROS: negative    History  Past Medical History:   Diagnosis Date   • Hydrocephalus (HCC)     shunt has been placed for 47 years   • Medical history non-contributory      Past Surgical History:   Procedure Laterality Date   • ENDOSCOPY N/A 9/17/2021    Procedure: ESOPHAGOGASTRODUODENOSCOPY WITH ANESTHESIA;  Surgeon: Matthew Oh MD;  Location: [facility] ENDOSCOPY;  Service: General;  Laterality: N/A;  pre: obesity  post: obesity. hiatal hernia.   Krishna Sanches MD   • SHUNT EXTERNALIZATION     • TUBAL ABDOMINAL LIGATION       History reviewed. No pertinent family history.  Social History     Tobacco Use   • Smoking status: Never Smoker   • Smokeless tobacco: Never Used   Vaping Use   •  Vaping Use: Never used   Substance Use Topics   • Alcohol use: No   • Drug use: No     E-cigarette/Vaping   • E-cigarette/Vaping Use Never User      E-cigarette/Vaping Substances     (Not in a hospital admission)    Allergies:  Patient has no known allergies.      Current Outpatient Medications:   •  cyclobenzaprine (FLEXERIL) 5 MG tablet, Take 5 mg by mouth 2 (Two) Times a Day As Needed for Muscle Spasms., Disp: , Rfl:     Objective     Vital Signs  Temp:  [98 °F (36.7 °C)] 98 °F (36.7 °C)  Heart Rate:  [97] 97  BP: (130)/(83) 130/83  Body mass index is 41.83 kg/m².      01/17/22  0834   Weight: 97.2 kg (214 lb 3.2 oz)       General Appearance:  awake, alert, oriented, in no acute distress  Lungs:  Normal expansion.  Clear to auscultation.  No rales, rhonchi, or wheezing.  Heart:  Heart regular rate and rhythm  Abdomen:  Soft, non-tender, normal bowel sounds; no bruits, organomegaly or masses.  Abnormal shape: obese      Results Review:   I reviewed the patient's new clinical results.        Assessment/Plan   Encounter Diagnoses   Name Primary?   • Class 3 severe obesity due to excess calories with serious comorbidity and body mass index (BMI) of 40.0 to 44.9 in adult (HCC) Yes   •  (ventriculoperitoneal) shunt status    • Hiatal hernia        I believe this patient will be a good candidate for weight loss surgery.    She has chosen laparoscopic sleeve gastrectomy. I agree with this decision.  I have discussed the Kayla - Y Gastric Bypass, laparoscopic sleeve gastrectomy and the Laparoscopic Gastric Band procedures to provide the alternatives which includes non surgical weight loss options as well.  We discussed the benefits of the surgeries including the benefit of weight loss and the possible reversal of co-morbid conditions associated with morbid obesity.  She is aware that the Sleeve procedure is not reversible.  We discussed the complications and risks which include the risk of perforation, leakage,bleeding,  intra-abdominal organ injury, specifically at high risks of the liver and spleen, stenosis or ulcerations, the risk of venous thrombosis formation in the lungs, mesentery veins or lower extremities  leading to possible organ injury and death.  I explained to the patient that there is a risk of infection.  I explained to her the possibility that this procedure may not be performed laparoscopically and may require being converted to an opened procedure or aborted due to abnormal anatomy.  Also postoperatively there is a risk of increased GERD symptoms after this procedure.  I have explained if she develops intestinal metaplasia of her esophagus consideration for conversion to another weight loss procedure may be necessary.    I have reviewed with the patient her 30-day mortality risk using the Bariatric Surgical Risk/Benefit Calculator to be 0.07%.    Zaida Report   As part of this patient's treatment plan I am prescribing controlled substances. The patient has been made aware of appropriate use of such medications, including potential risk of somnolence, limited ability to drive and /or work safely, and potential for dependence or overdose. It has also been made clear that these medications are for use by this patient only, without concomitant use of alcohol or other substances unless prescribed.    Mary Hunt will be required to complete the educational preoperative class detailing terms of the preoperative and postoperative recommendations, risks of surgery, the monitoring of the patient's ZAIDA reports if necessary. Mary Hunt is aware that inappropriate use of prescribed medications will result in cessation of prescribing such medications.    ZAIDA report has been reviewed.      History and physical exam exhibit continued safe and appropriate use of controlled substances.       Mary Hunt understands the surgical procedures and the different surgical options that are available.   Mary FELIX  "Marilee understands the lifestyle changes that are required after surgery and has agreed to follow the guidelines outlined in the weight management program. Mary Hunt also expressed understanding of the risks involved and had all of her questions answered and desires to proceed.    Upon completion of our discussion and addressing and answering her questions to her satisfaction, informed consent was obtained.   She will be scheduled accordingly for a laparoscopic Sleeve gastrectomy procedure.      I discussed the patient's findings and my recommendations with patient. The patient was made aware that we are primarily a surgical program.  We reviewed different weight loss surgical procedures including the laparoscopic sleeve gastrectomy, gastric band and Kayla-en-Y gastric bypass.  I explained to the patient that medical treatment alone is ineffective for long-term results and for the reversal of morbid obesity. She and I discussed the etiology of the disease of morbid obesity.  I emphasized that weight loss through conservative methods alone statistically will not reverse this disease of obesity long-term.    Our program is not a \"weight loss program\" but focuses on the overall issue of morbid obesity and the patient has been educated on what steps will be necessary to be successful in reversing this disease of morbid obesity at our facility.  The patient will require further evaluation of her foregut either through an upper endoscopy/EGD or radiographic studies.  I have explained the 3 pearls of our program for the patient to follow to optimize success:1.  Putting their health first, 2.  Not trying to treat the disease on their own, 3.  Attempting to make their scheduled appointments.  The patient was in agreement to following these recommendations.  The patient was also notified that our dietitian will be contacting them soon for follow-up on how they are doing with the new prescription.    I have also " recommended that she obtain completion of her preoperative course and laboratory work prior to surgery consideration.      Dr. Matthew Oh MD FACS    01/17/22  09:15 CST  Patient Care Team:  Krishna Sanches MD as PCP - General (Family Medicine)

## 2022-02-04 ENCOUNTER — APPOINTMENT (OUTPATIENT)
Dept: PREADMISSION TESTING | Facility: HOSPITAL | Age: 52
End: 2022-02-04

## 2022-02-07 ENCOUNTER — OFFICE VISIT (OUTPATIENT)
Dept: BARIATRICS/WEIGHT MGMT | Facility: CLINIC | Age: 52
End: 2022-02-07

## 2022-02-07 ENCOUNTER — PRE-ADMISSION TESTING (OUTPATIENT)
Dept: PREADMISSION TESTING | Facility: HOSPITAL | Age: 52
End: 2022-02-07

## 2022-02-07 ENCOUNTER — LAB (OUTPATIENT)
Dept: LAB | Facility: HOSPITAL | Age: 52
End: 2022-02-07

## 2022-02-07 VITALS
HEART RATE: 90 BPM | SYSTOLIC BLOOD PRESSURE: 150 MMHG | BODY MASS INDEX: 44.36 KG/M2 | OXYGEN SATURATION: 100 % | DIASTOLIC BLOOD PRESSURE: 79 MMHG | HEIGHT: 58 IN | RESPIRATION RATE: 20 BRPM

## 2022-02-07 VITALS
HEIGHT: 60 IN | WEIGHT: 216 LBS | DIASTOLIC BLOOD PRESSURE: 79 MMHG | BODY MASS INDEX: 42.41 KG/M2 | OXYGEN SATURATION: 99 % | TEMPERATURE: 97.7 F | HEART RATE: 90 BPM | SYSTOLIC BLOOD PRESSURE: 148 MMHG

## 2022-02-07 DIAGNOSIS — E66.01 CLASS 3 SEVERE OBESITY DUE TO EXCESS CALORIES WITH SERIOUS COMORBIDITY AND BODY MASS INDEX (BMI) OF 40.0 TO 44.9 IN ADULT: ICD-10-CM

## 2022-02-07 DIAGNOSIS — E66.01 CLASS 3 SEVERE OBESITY DUE TO EXCESS CALORIES WITH SERIOUS COMORBIDITY AND BODY MASS INDEX (BMI) OF 40.0 TO 44.9 IN ADULT: Primary | ICD-10-CM

## 2022-02-07 DIAGNOSIS — K44.9 HIATAL HERNIA: ICD-10-CM

## 2022-02-07 LAB
ALBUMIN SERPL-MCNC: 4.5 G/DL (ref 3.5–5.2)
ALBUMIN/GLOB SERPL: 1.2 G/DL
ALP SERPL-CCNC: 85 U/L (ref 39–117)
ALT SERPL W P-5'-P-CCNC: 22 U/L (ref 1–33)
ANION GAP SERPL CALCULATED.3IONS-SCNC: 9 MMOL/L (ref 5–15)
APTT PPP: 28.8 SECONDS (ref 24.1–35)
AST SERPL-CCNC: 21 U/L (ref 1–32)
BILIRUB SERPL-MCNC: 0.2 MG/DL (ref 0–1.2)
BUN SERPL-MCNC: 17 MG/DL (ref 6–20)
BUN/CREAT SERPL: 22.7 (ref 7–25)
CALCIUM SPEC-SCNC: 9.6 MG/DL (ref 8.6–10.5)
CHLORIDE SERPL-SCNC: 105 MMOL/L (ref 98–107)
CO2 SERPL-SCNC: 27 MMOL/L (ref 22–29)
CREAT SERPL-MCNC: 0.75 MG/DL (ref 0.57–1)
DEPRECATED RDW RBC AUTO: 41.1 FL (ref 37–54)
ERYTHROCYTE [DISTWIDTH] IN BLOOD BY AUTOMATED COUNT: 12.5 % (ref 12.3–15.4)
GFR SERPL CREATININE-BSD FRML MDRD: 99 ML/MIN/1.73
GLOBULIN UR ELPH-MCNC: 3.9 GM/DL
GLUCOSE SERPL-MCNC: 111 MG/DL (ref 65–99)
HCG SERPL QL: NEGATIVE
HCT VFR BLD AUTO: 38.4 % (ref 34–46.6)
HGB BLD-MCNC: 11.7 G/DL (ref 12–15.9)
INR PPP: 1.04 (ref 0.91–1.09)
MCH RBC QN AUTO: 27.5 PG (ref 26.6–33)
MCHC RBC AUTO-ENTMCNC: 30.5 G/DL (ref 31.5–35.7)
MCV RBC AUTO: 90.4 FL (ref 79–97)
PLATELET # BLD AUTO: 296 10*3/MM3 (ref 140–450)
PMV BLD AUTO: 10.5 FL (ref 6–12)
POTASSIUM SERPL-SCNC: 3.8 MMOL/L (ref 3.5–5.2)
PROT SERPL-MCNC: 8.4 G/DL (ref 6–8.5)
PROTHROMBIN TIME: 13.2 SECONDS (ref 11.9–14.6)
RBC # BLD AUTO: 4.25 10*6/MM3 (ref 3.77–5.28)
SARS-COV-2 ORF1AB RESP QL NAA+PROBE: NOT DETECTED
SODIUM SERPL-SCNC: 141 MMOL/L (ref 136–145)
WBC NRBC COR # BLD: 7.9 10*3/MM3 (ref 3.4–10.8)

## 2022-02-07 PROCEDURE — 85027 COMPLETE CBC AUTOMATED: CPT

## 2022-02-07 PROCEDURE — 36415 COLL VENOUS BLD VENIPUNCTURE: CPT

## 2022-02-07 PROCEDURE — 84703 CHORIONIC GONADOTROPIN ASSAY: CPT

## 2022-02-07 PROCEDURE — 99213 OFFICE O/P EST LOW 20 MIN: CPT | Performed by: SURGERY

## 2022-02-07 PROCEDURE — 93005 ELECTROCARDIOGRAM TRACING: CPT

## 2022-02-07 PROCEDURE — 80053 COMPREHEN METABOLIC PANEL: CPT

## 2022-02-07 PROCEDURE — 85610 PROTHROMBIN TIME: CPT

## 2022-02-07 PROCEDURE — 93010 ELECTROCARDIOGRAM REPORT: CPT | Performed by: INTERNAL MEDICINE

## 2022-02-07 PROCEDURE — 85730 THROMBOPLASTIN TIME PARTIAL: CPT

## 2022-02-07 PROCEDURE — U0004 COV-19 TEST NON-CDC HGH THRU: HCPCS

## 2022-02-07 PROCEDURE — C9803 HOPD COVID-19 SPEC COLLECT: HCPCS

## 2022-02-07 NOTE — H&P (VIEW-ONLY)
Patient Care Team:  Krishna Sanches MD as PCP - General (Family Medicine)    Reason for Visit:  Surgical Weight loss      Subjective   Mary Hunt is a 51 y.o. female.     Mary is here to update her consent form.  I explained to the patient we will be changing her surgery date from Wednesday to Thursday and because of this she will undergo a robotic approach.  She is aware that she has a hiatal hernia which will be addressed during the surgical procedure.  I explained the A, B, C and risks of the surgical procedure and of a hiatal hernia repair.  Patient was okay with having her procedure performed robotically.  I explained if there is circumstances which will require us to open there is a possibility of this as well.  Patient is continue to follow the dietary prescription as recommended and is now on her way to get her preoperative laboratory work.    Review Of Systems:  General ROS: negative  Gastrointestinal ROS: no abdominal pain, change in bowel habits, or black or bloody stools    The following portions of the patient's history were reviewed and updated as appropriate: allergies, current medications, past family history, past medical history, past social history, past surgical history and problem list.    Objective   /79 (BP Location: Right arm, Patient Position: Sitting, Cuff Size: Adult)   Pulse 90   Temp 97.7 °F (36.5 °C)   SpO2 99%   There were no vitals filed for this visit.    General Appearance:  awake, alert, oriented, in no acute distress  Abdomen:  Soft, non-tender, normal bowel sounds; no bruits, organomegaly or masses.  Abnormal shape: obese    Assessment/Plan     Encounter Diagnoses   Name Primary?   • Class 3 severe obesity due to excess calories with serious comorbidity and body mass index (BMI) of 40.0 to 44.9 in adult ([facility]) Yes   • Hiatal hernia        Mary Hunt was seen today for obtaining consent for the above procedure, and counseling on weight loss.  She  will be scheduled for her robotic assisted laparoscopic sleeve gastrectomy procedure with hiatal hernia repair.

## 2022-02-07 NOTE — DISCHARGE INSTRUCTIONS
DAY OF SURGERY INSTRUCTIONS        ARRIVAL TIME: AS DIRECTED BY OFFICE      YOU MAY TAKE THE FOLLOWING MEDICATION(S) THE MORNING OF SURGERY WITH A SIP OF WATER: AS DIRECTED BY OFFICE      ALL OTHER HOME MEDICATIONS CHECK WITH YOUR DOCTOR    DO NOT TAKE ANY ERECTILE DYSFUNCTION MEDICATIONS (EX: CIALIS, VIAGRA) 24 HOURS PRIOR TO SURGERY                                                                                BEFORE YOU COME TO THE HOSPITAL                                                                               (Pre-op instructions)    • Do not eat, drink, smoke or chew gum after 8 p.m. the night before surgery.  This also includes no mints.  • Morning of surgery take only the medicines you have been instructed.      • Drink only 8 ounces of sugar free Gatorade/Powerade (no red) as instructed.  • Do not shave, wear makeup or dark nail polish.  • Remove all jewelry including rings.  • Leave anything you consider valuable at home.  • Leave your suitcase in the car until after your surgery.  • Bring the following with you if applicable:  o Picture ID and insurance, Medicare or Medicaid cards  o Co-pay/deductible required by insurance (cash, check, credit card)  o Copy of advance directive, living will or power-of- documents if not brought to Pre-work  o CPAP or BIPAP mask and tubing  o Relaxation aids ( book, magazine), etc.  o Hearing aids                                                         ON THE DAY OF SURGERY  · On the day of surgery check in at registration located at the main entrance of the hospital. Only one family member or friend are allowed per patient.  ? You will be registered and given a beeper with instructions where to wait in the main lobby.  ? When your beeper lights up and vibrates a member of the Outpatient Surgery staff will meet you at the double doors under the stair steps and escort you to your preoperative room.   · You may have cloth compression devices placed on your  legs. These help to prevent blood clots and reduce swelling in your legs.  · An IV may be inserted into one of your veins.  · In the operating room, you may be given one or more of the following:  ? A medicine to help you relax (sedative).  ? A medicine to numb the area (local anesthetic).  ? A medicine to make you fall asleep (general anesthetic).  ? A medicine that is injected into an area of your body to numb everything below the injection site (regional anesthetic).  · Your surgical site will be marked or identified.  · You may be given an antibiotic through your IV to help prevent infection.  Contact a health care provider if you:  · Develop a fever of more than 100.4°F (38°C) or other feelings of illness during the 48 hours before your surgery.  · Have symptoms that get worse.  Have questions or concerns about your surgery      MANAGING PAIN AFTER SURGERY    We know you are probably wondering what your pain will be like after surgery.  Following surgery it is unrealistic to expect you will not have pain.   Pain is how our bodies let us know that something is wrong or cautions us to be careful.  That said, our goal is to make your pain tolerable.    Methods we may use to treat your pain include (oral or IV medications, PCAs, epidurals, nerve blocks, etc.)   While some procedures require IV pain medications for a short time after surgery, transitioning to pain medications by mouth allows for better management of pain.   Your nurse will encourage you to take oral pain medications whenever possible.  IV medications work almost immediately, but only last a short while.  Taking medications by mouth allows for a more constant level of medication in your blood stream for a longer period of time.      Once your pain is out of control it is harder to get back under control.  It is important you are aware when your next dose of pain medication is due.  If you are admitted, your nurse may write the time of your next dose  "on the white board in your room to help you remember.      We are interested in your pain and encourage you to inform us about aggravating factors during your visit.   Many times a simple repositioning every few hours can make a big difference.    If your physician says it is okay, do not let your pain prevent you from getting out of bed. Be sure to call your nurse for assistance prior to getting up so you do not fall.      Before surgery, please decide your tolerable pain goal.  These faces help describe the pain ratings we use on a 0-10 scale.   Be prepared to tell us your goal and whether or not you take pain or anxiety medications at home.                      General Anesthesia/Surgery, Adult  General anesthesia is the use of medicines to make a person \"go to sleep\" (unconscious) for a medical procedure. General anesthesia must be used for certain procedures, and is often recommended for procedures that:  · Last a long time.  · Require you to be still or in an unusual position.  · Are major and can cause blood loss.  The medicines used for general anesthesia are called general anesthetics. As well as making you unconscious for a certain amount of time, these medicines:  · Prevent pain.  · Control your blood pressure.  · Relax your muscles.  Tell a health care provider about:  · Any allergies you have.  · All medicines you are taking, including vitamins, herbs, eye drops, creams, and over-the-counter medicines.  · Any problems you or family members have had with anesthetic medicines.  · Types of anesthetics you have had in the past.  · Any blood disorders you have.  · Any surgeries you have had.  · Any medical conditions you have.  · Any recent upper respiratory, chest, or ear infections.  · Any history of:  ? Heart or lung conditions, such as heart failure, sleep apnea, asthma, or chronic obstructive pulmonary disease (COPD).  ?  service.  ? Depression or anxiety.  · Any tobacco or drug use, including " marijuana or alcohol use.  · Whether you are pregnant or may be pregnant.  What are the risks?  Generally, this is a safe procedure. However, problems may occur, including:  · Allergic reaction.  · Lung and heart problems.  · Inhaling food or liquid from the stomach into the lungs (aspiration).  · Nerve injury.  · Air in the bloodstream, which can lead to stroke.  · Extreme agitation or confusion (delirium) when you wake up from the anesthetic.  · Waking up during your procedure and being unable to move. This is rare.  These problems are more likely to develop if you are having a major surgery or if you have an advanced or serious medical condition. You can prevent some of these complications by answering all of your health care provider's questions thoroughly and by following all instructions before your procedure.  General anesthesia can cause side effects, including:  · Nausea or vomiting.  · A sore throat from the breathing tube.  · Hoarseness.  · Wheezing or coughing.  · Shaking chills.  · Tiredness.  · Body aches.  · Anxiety.  · Sleepiness or drowsiness.  · Confusion or agitation.  RISKS AND COMPLICATIONS OF SURGERY  Your health care provider will discuss possible risks and complications with you before surgery. Common risks and complications include:    · Problems due to the use of anesthetics.  · Blood loss and replacement (does not apply to minor surgical procedures).  · Temporary increase in pain due to surgery.  · Uncorrected pain or problems that the surgery was meant to correct.  · Infection.  · New damage.    What happens before the procedure?    Medicines  Ask your health care provider about:  · Changing or stopping your regular medicines. This is especially important if you are taking diabetes medicines or blood thinners.  · Taking medicines such as aspirin and ibuprofen. These medicines can thin your blood. Do not take these medicines unless your health care provider tells you to take  them.  · Taking over-the-counter medicines, vitamins, herbs, and supplements. Do not take these during the week before your procedure unless your health care provider approves them.  General instructions  · Starting 3-6 weeks before the procedure, do not use any products that contain nicotine or tobacco, such as cigarettes and e-cigarettes. If you need help quitting, ask your health care provider.  · If you brush your teeth on the morning of the procedure, make sure to spit out all of the toothpaste.  · Tell your health care provider if you become ill or develop a cold, cough, or fever.  · If instructed by your health care provider, bring your sleep apnea device with you on the day of your surgery (if applicable).  · Ask your health care provider if you will be going home the same day, the following day, or after a longer hospital stay.  ? Plan to have someone take you home from the hospital or clinic.  ? Plan to have a responsible adult care for you for at least 24 hours after you leave the hospital or clinic. This is important.  What happens during the procedure?  · You will be given anesthetics through both of the following:  ? A mask placed over your nose and mouth.  ? An IV in one of your veins.  · You may receive a medicine to help you relax (sedative).  · After you are unconscious, a breathing tube may be inserted down your throat to help you breathe. This will be removed before you wake up.  · An anesthesia specialist will stay with you throughout your procedure. He or she will:  ? Keep you comfortable and safe by continuing to give you medicines and adjusting the amount of medicine that you get.  ? Monitor your blood pressure, pulse, and oxygen levels to make sure that the anesthetics do not cause any problems.  The procedure may vary among health care providers and hospitals.  What happens after the procedure?  · Your blood pressure, temperature, heart rate, breathing rate, and blood oxygen level will be  monitored until the medicines you were given have worn off.  · You will wake up in a recovery area. You may wake up slowly.  · If you feel anxious or agitated, you may be given medicine to help you calm down.  · If you will be going home the same day, your health care provider may check to make sure you can walk, drink, and urinate.  · Your health care provider will treat any pain or side effects you have before you go home.  · Do not drive for 24 hours if you were given a sedative.  Summary  · General anesthesia is used to keep you still and prevent pain during a procedure.  · It is important to tell your healthcare provider about your medical history and any surgeries you have had, and previous experience with anesthesia.  · Follow your healthcare provider’s instructions about when to stop eating, drinking, or taking certain medicines before your procedure.  · Plan to have someone take you home from the hospital or clinic.  This information is not intended to replace advice given to you by your health care provider. Make sure you discuss any questions you have with your health care provider.  Document Released: 03/26/2009 Document Revised: 08/03/2018 Document Reviewed: 08/03/2018  [employer] Interactive Patient Education © 2019 [employer]    Fall Prevention in Hospitals, Adult  As a hospital patient, your condition and the treatments you receive can increase your risk for falls. Some additional risk factors for falls in a hospital include:  · Being in an unfamiliar environment.  · Being on bed rest.  · Your surgery.  · Taking certain medicines.  · Your tubing requirements, such as intravenous (IV) therapy or catheters.  It is important that you learn how to decrease fall risks while at the hospital. Below are important tips that can help prevent falls.  SAFETY TIPS FOR PREVENTING FALLS  Talk about your risk of falling.  · Ask your health care provider why you are at risk for falling. Is it your medicine, illness,  tubing placement, or something else?  · Make a plan with your health care provider to keep you safe from falls.  · Ask your health care provider or pharmacist about side effects of your medicines. Some medicines can make you dizzy or affect your coordination.  Ask for help.  · Ask for help before getting out of bed. You may need to press your call button.  · Ask for assistance in getting safely to the toilet.  · Ask for a walker or cane to be put at your bedside. Ask that most of the side rails on your bed be placed up before your health care provider leaves the room.  · Ask family or friends to sit with you.  · Ask for things that are out of your reach, such as your glasses, hearing aids, telephone, bedside table, or call button.  Follow these tips to avoid falling:  · Stay lying or seated, rather than standing, while waiting for help.  · Wear rubber-soled slippers or shoes whenever you walk in the hospital.  · Avoid quick, sudden movements.  ¨ Change positions slowly.  ¨ Sit on the side of your bed before standing.  ¨ Stand up slowly and wait before you start to walk.  · Let your health care provider know if there is a spill on the floor.  · Pay careful attention to the medical equipment, electrical cords, and tubes around you.  · When you need help, use your call button by your bed or in the bathroom. Wait for one of your health care providers to help you.  · If you feel dizzy or unsure of your footing, return to bed and wait for assistance.  · Avoid being distracted by the TV, telephone, or another person in your room.  · Do not lean or support yourself on rolling objects, such as IV poles or bedside tables.     This information is not intended to replace advice given to you by your health care provider. Make sure you discuss any questions you have with your health care provider.     Document Released: 12/15/2001 Document Revised: 01/08/2016 Document Reviewed: 08/25/2013  [employer] Interactive Patient Education  ©2016 [employer]         [facility]  CHG 4% Patient Instruction Sheet    Chlorhexidine Before Surgery  Chlorhexidine gluconate (CHG) is a germ-killing (antiseptic) solution that is used to clean the skin. It gets rid of the bacteria that normally live on the skin. Cleaning your skin with CHG before surgery helps lower the risk for infection after surgery.     How to use CHG solution  · You will take 2 showers, one shower the night before surgery, the second shower the morning of surgery before coming to the hospital.  · Use CHG only as told by your health care provider, and follow the instructions on the label.  · Use CHG solution while taking a shower. Follow these steps when using CHG solution (unless your health care provider gives you different instructions):  1. Start the shower.  2. Use your normal soap and shampoo to wash your face and hair.  3. Turn off the shower or move out of the shower stream.  4. Pour the CHG onto a clean washcloth. Do not use any type of brush or rough-edged sponge.  5. Starting at your neck, lather your body down to your toes. Make sure you:  6. Pay special attention to the part of your body where you will be having surgery. Scrub this area for at least 1 minute.  7. Use the full amount of CHG as directed. Usually, this is one half bottle for each shower.  8. Do not use CHG on your head or face. If the solution gets into your ears or eyes, rinse them well with water.  9. Avoid your genital area.  10. Avoid any areas of skin that have broken skin, cuts, or scrapes.  11. Scrub your back and under your arms. Make sure to wash skin folds.  12. Let the lather sit on your skin for 1-2 minutes or as long as told by your health care provider.  13. Thoroughly rinse your entire body in the shower. Make sure that all body creases and crevices are rinsed well.  14. Dry off with a clean towel. Do not put any substances on your body afterward, such as powder, lotion, or perfume.  15.  Put on clean clothes or pajamas.  16. If it is the night before your surgery, sleep in clean sheets.    What are the risks?  Risks of using CHG include:  · A skin reaction.  · Hearing loss, if CHG gets in your ears.  · Eye injury, if CHG gets in your eyes and is not rinsed out.  · The CHG product catching fire.  Make sure that you avoid smoking and flames after applying CHG to your skin.  Do not use CHG:  · If you have a chlorhexidine allergy or have previously reacted to chlorhexidine.  · On babies younger than 2 months of age.      On the day of surgery, when you are taken to your room in Outpatient Surgery you will be given a CHG prepackaged cloth to wipe the site for your surgery.  How to use CHG prepackaged cloths  · Follow the instructions on the label.  · Use the CHG cloth on clean, dry skin. Follow these steps when using a CHG cloth(unless your health care provider gives you different instructions):  1. Using the CHG cloth, vigorously scrub the part of your body where you will be having surgery. Scrub using a back-and-forth motion for 3 minutes. The area on your body should be completely wet with CHG when you are finished scrubbing.  2. Do not rinse. Discard the cloth and let the area air-dry for 1 minute. Do not put any substances on your body afterward, such as powder, lotion, or perfume.  Contact a health care provider if:  · Your skin gets irritated after scrubbing.  · You have questions about using your solution or cloth.  Get help right away if:  · Your eyes become very red or swollen.  · Your eyes itch badly.  · Your skin itches badly and is red or swollen.  · Your hearing changes.  · You have trouble seeing.  · You have swelling or tingling in your mouth or throat.  · You have trouble breathing.  · You swallow any chlorhexidine.  Summary  · Chlorhexidine gluconate (CHG) is a germ-killing (antiseptic) solution that is used to clean the skin. Cleaning your skin with CHG before surgery helps lower the  risk for infection after surgery.  · You may be given CHG to use at home. It may be in a bottle or in a prepackaged cloth to use on your skin. Carefully follow your health care provider's instructions and the instructions on the product label.  · Do not use CHG if you have a chlorhexidine allergy.  · Contact your health care provider if your skin gets irritated after scrubbing.  This information is not intended to replace advice given to you by your health care provider. Make sure you discuss any questions you have with your health care provider.  Document Released: 09/11/2013 Document Revised: 11/15/2018 Document Reviewed: 11/15/2018  [employer] Interactive Patient Education © 2019 [employer]

## 2022-02-07 NOTE — PROGRESS NOTES
Patient Care Team:  Krishna Sanches MD as PCP - General (Family Medicine)    Reason for Visit:  Surgical Weight loss      Subjective   Mary Hunt is a 51 y.o. female.     Mary is here to update her consent form.  I explained to the patient we will be changing her surgery date from Wednesday to Thursday and because of this she will undergo a robotic approach.  She is aware that she has a hiatal hernia which will be addressed during the surgical procedure.  I explained the A, B, C and risks of the surgical procedure and of a hiatal hernia repair.  Patient was okay with having her procedure performed robotically.  I explained if there is circumstances which will require us to open there is a possibility of this as well.  Patient is continue to follow the dietary prescription as recommended and is now on her way to get her preoperative laboratory work.    Review Of Systems:  General ROS: negative  Gastrointestinal ROS: no abdominal pain, change in bowel habits, or black or bloody stools    The following portions of the patient's history were reviewed and updated as appropriate: allergies, current medications, past family history, past medical history, past social history, past surgical history and problem list.    Objective   /79 (BP Location: Right arm, Patient Position: Sitting, Cuff Size: Adult)   Pulse 90   Temp 97.7 °F (36.5 °C)   SpO2 99%   There were no vitals filed for this visit.    General Appearance:  awake, alert, oriented, in no acute distress  Abdomen:  Soft, non-tender, normal bowel sounds; no bruits, organomegaly or masses.  Abnormal shape: obese    Assessment/Plan     Encounter Diagnoses   Name Primary?   • Class 3 severe obesity due to excess calories with serious comorbidity and body mass index (BMI) of 40.0 to 44.9 in adult ([facility]) Yes   • Hiatal hernia        Mary Hunt was seen today for obtaining consent for the above procedure, and counseling on weight loss.  She  will be scheduled for her robotic assisted laparoscopic sleeve gastrectomy procedure with hiatal hernia repair.

## 2022-02-08 ENCOUNTER — TELEPHONE (OUTPATIENT)
Dept: BARIATRICS/WEIGHT MGMT | Facility: CLINIC | Age: 52
End: 2022-02-08

## 2022-02-08 LAB
QT INTERVAL: 388 MS
QTC INTERVAL: 453 MS

## 2022-02-08 NOTE — TELEPHONE ENCOUNTER
Call the patient about her reported the EKG findings.  I explained to the patient that I will return a phone call in the morning to verify these results and determine if she may proceed with surgery on Thursday.  The patient of note denied any chest pain fevers or chills or shortness of breath with exertion.  She feels no significant changes or pressure in her chest.

## 2022-02-09 ENCOUNTER — TELEPHONE (OUTPATIENT)
Dept: BARIATRICS/WEIGHT MGMT | Facility: CLINIC | Age: 52
End: 2022-02-09

## 2022-02-09 NOTE — TELEPHONE ENCOUNTER
Talk with the patient at 9:40 AM February 9, 2022 for follow-up due to her change in her EKG. I reviewed the findings with the anesthesia team and was guided to ask these questions.  The patient states she does not feel chest pain or shortness of breath when she walks 2 blocks and does not feel short of breath or chest pain or pressure when she walks a flight of steps.  I explained to the patient we will then proceed with surgery as scheduled.

## 2022-02-10 ENCOUNTER — ANESTHESIA EVENT (OUTPATIENT)
Dept: PERIOP | Facility: HOSPITAL | Age: 52
End: 2022-02-10

## 2022-02-10 ENCOUNTER — ANESTHESIA (OUTPATIENT)
Dept: PERIOP | Facility: HOSPITAL | Age: 52
End: 2022-02-10

## 2022-02-10 ENCOUNTER — HOSPITAL ENCOUNTER (INPATIENT)
Facility: HOSPITAL | Age: 52
LOS: 1 days | Discharge: HOME | End: 2022-02-11
Attending: SURGERY | Admitting: SURGERY

## 2022-02-10 DIAGNOSIS — Z98.84 STATUS POST LAPAROSCOPIC SLEEVE GASTRECTOMY: Primary | ICD-10-CM

## 2022-02-10 DIAGNOSIS — K44.9 HIATAL HERNIA: ICD-10-CM

## 2022-02-10 DIAGNOSIS — E66.01 CLASS 3 SEVERE OBESITY DUE TO EXCESS CALORIES WITH SERIOUS COMORBIDITY AND BODY MASS INDEX (BMI) OF 40.0 TO 44.9 IN ADULT: ICD-10-CM

## 2022-02-10 LAB
ABO GROUP BLD: NORMAL
B-HCG UR QL: NEGATIVE
BLD GP AB SCN SERPL QL: NEGATIVE
RH BLD: POSITIVE
T&S EXPIRATION DATE: NORMAL

## 2022-02-10 PROCEDURE — 25010000002 ONDANSETRON PER 1 MG: Performed by: SURGERY

## 2022-02-10 PROCEDURE — C1889 IMPLANT/INSERT DEVICE, NOC: HCPCS | Performed by: SURGERY

## 2022-02-10 PROCEDURE — 25010000002 CEFAZOLIN PER 500 MG

## 2022-02-10 PROCEDURE — 25010000002 KETOROLAC TROMETHAMINE PER 15 MG: Performed by: NURSE ANESTHETIST, CERTIFIED REGISTERED

## 2022-02-10 PROCEDURE — 0 LIDOCAINE 1 % SOLUTION 20 ML VIAL: Performed by: SURGERY

## 2022-02-10 PROCEDURE — 88307 TISSUE EXAM BY PATHOLOGIST: CPT | Performed by: SURGERY

## 2022-02-10 PROCEDURE — 0BQT4ZZ REPAIR DIAPHRAGM, PERCUTANEOUS ENDOSCOPIC APPROACH: ICD-10-PCS | Performed by: SURGERY

## 2022-02-10 PROCEDURE — 25010000002 CEFAZOLIN PER 500 MG: Performed by: SURGERY

## 2022-02-10 PROCEDURE — 8E0W4CZ ROBOTIC ASSISTED PROCEDURE OF TRUNK REGION, PERCUTANEOUS ENDOSCOPIC APPROACH: ICD-10-PCS | Performed by: SURGERY

## 2022-02-10 PROCEDURE — 86850 RBC ANTIBODY SCREEN: CPT | Performed by: SURGERY

## 2022-02-10 PROCEDURE — 94799 UNLISTED PULMONARY SVC/PX: CPT

## 2022-02-10 PROCEDURE — 25010000002 FENTANYL CITRATE (PF) 250 MCG/5ML SOLUTION: Performed by: NURSE ANESTHETIST, CERTIFIED REGISTERED

## 2022-02-10 PROCEDURE — 86901 BLOOD TYPING SEROLOGIC RH(D): CPT | Performed by: SURGERY

## 2022-02-10 PROCEDURE — 0DNW4ZZ RELEASE PERITONEUM, PERCUTANEOUS ENDOSCOPIC APPROACH: ICD-10-PCS | Performed by: SURGERY

## 2022-02-10 PROCEDURE — 25010000002 PROPOFOL 10 MG/ML EMULSION: Performed by: NURSE ANESTHETIST, CERTIFIED REGISTERED

## 2022-02-10 PROCEDURE — 43775 LAP SLEEVE GASTRECTOMY: CPT | Performed by: SURGERY

## 2022-02-10 PROCEDURE — 86900 BLOOD TYPING SEROLOGIC ABO: CPT | Performed by: SURGERY

## 2022-02-10 PROCEDURE — 25010000002 FENTANYL CITRATE (PF) 100 MCG/2ML SOLUTION: Performed by: NURSE ANESTHETIST, CERTIFIED REGISTERED

## 2022-02-10 PROCEDURE — 81025 URINE PREGNANCY TEST: CPT | Performed by: SURGERY

## 2022-02-10 PROCEDURE — 0DB64Z3 EXCISION OF STOMACH, PERCUTANEOUS ENDOSCOPIC APPROACH, VERTICAL: ICD-10-PCS | Performed by: SURGERY

## 2022-02-10 PROCEDURE — 25010000002 ENOXAPARIN PER 10 MG: Performed by: SURGERY

## 2022-02-10 PROCEDURE — 25010000002 ONDANSETRON PER 1 MG: Performed by: NURSE ANESTHETIST, CERTIFIED REGISTERED

## 2022-02-10 PROCEDURE — 25010000002 KETOROLAC TROMETHAMINE PER 15 MG: Performed by: SURGERY

## 2022-02-10 DEVICE — HEMOST ABS SURGICEL SNOW 4X4IN: Type: IMPLANTABLE DEVICE | Site: STOMACH | Status: FUNCTIONAL

## 2022-02-10 DEVICE — STAPLER 60 RELOAD BLUE
Type: IMPLANTABLE DEVICE | Site: STOMACH | Status: FUNCTIONAL
Brand: SUREFORM

## 2022-02-10 DEVICE — ABSORBABLE WOUND CLOSURE DEVICE
Type: IMPLANTABLE DEVICE | Site: STOMACH | Status: FUNCTIONAL
Brand: V-LOC 90

## 2022-02-10 DEVICE — LIGAMAX 5 MM ENDOSCOPIC MULTIPLE CLIP APPLIER
Type: IMPLANTABLE DEVICE | Site: STOMACH | Status: FUNCTIONAL
Brand: LIGAMAX

## 2022-02-10 DEVICE — STAPLER 60 RELOAD WHITE
Type: IMPLANTABLE DEVICE | Site: STOMACH | Status: FUNCTIONAL
Brand: SUREFORM

## 2022-02-10 RX ORDER — SODIUM CHLORIDE 0.9 % (FLUSH) 0.9 %
10 SYRINGE (ML) INJECTION EVERY 12 HOURS SCHEDULED
Status: DISCONTINUED | OUTPATIENT
Start: 2022-02-10 | End: 2022-02-11 | Stop reason: HOSPADM

## 2022-02-10 RX ORDER — SUCCINYLCHOLINE/SOD CL,ISO/PF 200MG/10ML
SYRINGE (ML) INTRAVENOUS AS NEEDED
Status: DISCONTINUED | OUTPATIENT
Start: 2022-02-10 | End: 2022-02-10 | Stop reason: SURG

## 2022-02-10 RX ORDER — FENTANYL CITRATE 50 UG/ML
INJECTION, SOLUTION INTRAMUSCULAR; INTRAVENOUS AS NEEDED
Status: DISCONTINUED | OUTPATIENT
Start: 2022-02-10 | End: 2022-02-10 | Stop reason: SURG

## 2022-02-10 RX ORDER — ROCURONIUM BROMIDE 10 MG/ML
INJECTION, SOLUTION INTRAVENOUS AS NEEDED
Status: DISCONTINUED | OUTPATIENT
Start: 2022-02-10 | End: 2022-02-10 | Stop reason: SURG

## 2022-02-10 RX ORDER — SODIUM CHLORIDE, SODIUM LACTATE, POTASSIUM CHLORIDE, CALCIUM CHLORIDE 600; 310; 30; 20 MG/100ML; MG/100ML; MG/100ML; MG/100ML
1000 INJECTION, SOLUTION INTRAVENOUS CONTINUOUS
Status: DISCONTINUED | OUTPATIENT
Start: 2022-02-10 | End: 2022-02-10 | Stop reason: HOSPADM

## 2022-02-10 RX ORDER — NEOSTIGMINE METHYLSULFATE 5 MG/5 ML
SYRINGE (ML) INTRAVENOUS AS NEEDED
Status: DISCONTINUED | OUTPATIENT
Start: 2022-02-10 | End: 2022-02-10 | Stop reason: SURG

## 2022-02-10 RX ORDER — SCOLOPAMINE TRANSDERMAL SYSTEM 1 MG/1
1 PATCH, EXTENDED RELEASE TRANSDERMAL ONCE
Status: DISCONTINUED | OUTPATIENT
Start: 2022-02-10 | End: 2022-02-11 | Stop reason: HOSPADM

## 2022-02-10 RX ORDER — GABAPENTIN 250 MG/5ML
250 SOLUTION ORAL ONCE
Status: COMPLETED | OUTPATIENT
Start: 2022-02-10 | End: 2022-02-10

## 2022-02-10 RX ORDER — PHENYLEPHRINE HCL IN 0.9% NACL 1 MG/10 ML
SYRINGE (ML) INTRAVENOUS AS NEEDED
Status: DISCONTINUED | OUTPATIENT
Start: 2022-02-10 | End: 2022-02-10 | Stop reason: SURG

## 2022-02-10 RX ORDER — SODIUM CHLORIDE, SODIUM LACTATE, POTASSIUM CHLORIDE, CALCIUM CHLORIDE 600; 310; 30; 20 MG/100ML; MG/100ML; MG/100ML; MG/100ML
50 INJECTION, SOLUTION INTRAVENOUS CONTINUOUS
Status: DISCONTINUED | OUTPATIENT
Start: 2022-02-10 | End: 2022-02-11 | Stop reason: HOSPADM

## 2022-02-10 RX ORDER — LIDOCAINE HYDROCHLORIDE 10 MG/ML
0.5 INJECTION, SOLUTION EPIDURAL; INFILTRATION; INTRACAUDAL; PERINEURAL ONCE AS NEEDED
Status: DISCONTINUED | OUTPATIENT
Start: 2022-02-10 | End: 2022-02-10 | Stop reason: HOSPADM

## 2022-02-10 RX ORDER — NALOXONE HCL 0.4 MG/ML
0.4 VIAL (ML) INJECTION AS NEEDED
Status: DISCONTINUED | OUTPATIENT
Start: 2022-02-10 | End: 2022-02-10 | Stop reason: HOSPADM

## 2022-02-10 RX ORDER — SODIUM CHLORIDE 0.9 % (FLUSH) 0.9 %
3 SYRINGE (ML) INJECTION EVERY 12 HOURS SCHEDULED
Status: DISCONTINUED | OUTPATIENT
Start: 2022-02-10 | End: 2022-02-10 | Stop reason: HOSPADM

## 2022-02-10 RX ORDER — KETOROLAC TROMETHAMINE 30 MG/ML
INJECTION, SOLUTION INTRAMUSCULAR; INTRAVENOUS AS NEEDED
Status: DISCONTINUED | OUTPATIENT
Start: 2022-02-10 | End: 2022-02-10 | Stop reason: SURG

## 2022-02-10 RX ORDER — DEXAMETHASONE SODIUM PHOSPHATE 4 MG/ML
4 INJECTION, SOLUTION INTRA-ARTICULAR; INTRALESIONAL; INTRAMUSCULAR; INTRAVENOUS; SOFT TISSUE EVERY 8 HOURS PRN
Status: DISCONTINUED | OUTPATIENT
Start: 2022-02-10 | End: 2022-02-11 | Stop reason: HOSPADM

## 2022-02-10 RX ORDER — MAGNESIUM HYDROXIDE 1200 MG/15ML
LIQUID ORAL AS NEEDED
Status: DISCONTINUED | OUTPATIENT
Start: 2022-02-10 | End: 2022-02-10 | Stop reason: HOSPADM

## 2022-02-10 RX ORDER — ONDANSETRON 2 MG/ML
INJECTION INTRAMUSCULAR; INTRAVENOUS AS NEEDED
Status: DISCONTINUED | OUTPATIENT
Start: 2022-02-10 | End: 2022-02-10 | Stop reason: SURG

## 2022-02-10 RX ORDER — SODIUM CHLORIDE 0.9 % (FLUSH) 0.9 %
3 SYRINGE (ML) INJECTION AS NEEDED
Status: DISCONTINUED | OUTPATIENT
Start: 2022-02-10 | End: 2022-02-10 | Stop reason: HOSPADM

## 2022-02-10 RX ORDER — KETOROLAC TROMETHAMINE 30 MG/ML
30 INJECTION, SOLUTION INTRAMUSCULAR; INTRAVENOUS EVERY 6 HOURS PRN
Status: DISCONTINUED | OUTPATIENT
Start: 2022-02-10 | End: 2022-02-11 | Stop reason: HOSPADM

## 2022-02-10 RX ORDER — BUPIVACAINE HCL/0.9 % NACL/PF 0.1 %
2 PLASTIC BAG, INJECTION (ML) EPIDURAL EVERY 8 HOURS
Status: COMPLETED | OUTPATIENT
Start: 2022-02-10 | End: 2022-02-11

## 2022-02-10 RX ORDER — LIDOCAINE HYDROCHLORIDE 20 MG/ML
INJECTION, SOLUTION EPIDURAL; INFILTRATION; INTRACAUDAL; PERINEURAL AS NEEDED
Status: DISCONTINUED | OUTPATIENT
Start: 2022-02-10 | End: 2022-02-10 | Stop reason: SURG

## 2022-02-10 RX ORDER — ONDANSETRON 2 MG/ML
4 INJECTION INTRAMUSCULAR; INTRAVENOUS ONCE AS NEEDED
Status: DISCONTINUED | OUTPATIENT
Start: 2022-02-10 | End: 2022-02-10 | Stop reason: HOSPADM

## 2022-02-10 RX ORDER — LABETALOL HYDROCHLORIDE 5 MG/ML
10 INJECTION, SOLUTION INTRAVENOUS EVERY 6 HOURS PRN
Status: DISCONTINUED | OUTPATIENT
Start: 2022-02-10 | End: 2022-02-11 | Stop reason: HOSPADM

## 2022-02-10 RX ORDER — SODIUM CHLORIDE 0.9 % (FLUSH) 0.9 %
10 SYRINGE (ML) INJECTION AS NEEDED
Status: DISCONTINUED | OUTPATIENT
Start: 2022-02-10 | End: 2022-02-10 | Stop reason: HOSPADM

## 2022-02-10 RX ORDER — BUPIVACAINE HCL/0.9 % NACL/PF 0.1 %
2 PLASTIC BAG, INJECTION (ML) EPIDURAL ONCE
Status: COMPLETED | OUTPATIENT
Start: 2022-02-10 | End: 2022-02-10

## 2022-02-10 RX ORDER — ONDANSETRON 2 MG/ML
4 INJECTION INTRAMUSCULAR; INTRAVENOUS EVERY 6 HOURS
Status: DISCONTINUED | OUTPATIENT
Start: 2022-02-10 | End: 2022-02-11 | Stop reason: HOSPADM

## 2022-02-10 RX ORDER — SODIUM CHLORIDE 0.9 % (FLUSH) 0.9 %
1-10 SYRINGE (ML) INJECTION AS NEEDED
Status: DISCONTINUED | OUTPATIENT
Start: 2022-02-10 | End: 2022-02-11 | Stop reason: HOSPADM

## 2022-02-10 RX ORDER — LIDOCAINE HYDROCHLORIDE 40 MG/ML
SOLUTION TOPICAL AS NEEDED
Status: DISCONTINUED | OUTPATIENT
Start: 2022-02-10 | End: 2022-02-10 | Stop reason: SURG

## 2022-02-10 RX ORDER — FENTANYL CITRATE 50 UG/ML
25 INJECTION, SOLUTION INTRAMUSCULAR; INTRAVENOUS
Status: DISCONTINUED | OUTPATIENT
Start: 2022-02-10 | End: 2022-02-10 | Stop reason: HOSPADM

## 2022-02-10 RX ORDER — SIMETHICONE 80 MG
40 TABLET,CHEWABLE ORAL 4 TIMES DAILY PRN
Status: DISCONTINUED | OUTPATIENT
Start: 2022-02-10 | End: 2022-02-11 | Stop reason: HOSPADM

## 2022-02-10 RX ORDER — SODIUM CHLORIDE, SODIUM LACTATE, POTASSIUM CHLORIDE, CALCIUM CHLORIDE 600; 310; 30; 20 MG/100ML; MG/100ML; MG/100ML; MG/100ML
100 INJECTION, SOLUTION INTRAVENOUS CONTINUOUS
Status: DISCONTINUED | OUTPATIENT
Start: 2022-02-10 | End: 2022-02-10 | Stop reason: HOSPADM

## 2022-02-10 RX ORDER — FLUMAZENIL 0.1 MG/ML
0.2 INJECTION INTRAVENOUS AS NEEDED
Status: DISCONTINUED | OUTPATIENT
Start: 2022-02-10 | End: 2022-02-10 | Stop reason: HOSPADM

## 2022-02-10 RX ORDER — DIPHENHYDRAMINE HYDROCHLORIDE 50 MG/ML
25 INJECTION INTRAMUSCULAR; INTRAVENOUS EVERY 6 HOURS PRN
Status: DISCONTINUED | OUTPATIENT
Start: 2022-02-10 | End: 2022-02-11 | Stop reason: HOSPADM

## 2022-02-10 RX ORDER — PROPOFOL 10 MG/ML
VIAL (ML) INTRAVENOUS AS NEEDED
Status: DISCONTINUED | OUTPATIENT
Start: 2022-02-10 | End: 2022-02-10 | Stop reason: SURG

## 2022-02-10 RX ORDER — LABETALOL HYDROCHLORIDE 5 MG/ML
5 INJECTION, SOLUTION INTRAVENOUS
Status: DISCONTINUED | OUTPATIENT
Start: 2022-02-10 | End: 2022-02-10 | Stop reason: HOSPADM

## 2022-02-10 RX ORDER — SODIUM CHLORIDE 0.9 % (FLUSH) 0.9 %
3-10 SYRINGE (ML) INJECTION AS NEEDED
Status: DISCONTINUED | OUTPATIENT
Start: 2022-02-10 | End: 2022-02-10 | Stop reason: HOSPADM

## 2022-02-10 RX ADMIN — ONDANSETRON 4 MG: 2 INJECTION INTRAMUSCULAR; INTRAVENOUS at 17:36

## 2022-02-10 RX ADMIN — Medication 4 MCG: at 14:26

## 2022-02-10 RX ADMIN — FENTANYL CITRATE 50 MCG: 50 INJECTION, SOLUTION INTRAMUSCULAR; INTRAVENOUS at 12:01

## 2022-02-10 RX ADMIN — KETOROLAC TROMETHAMINE 30 MG: 30 INJECTION, SOLUTION INTRAMUSCULAR; INTRAVENOUS at 14:21

## 2022-02-10 RX ADMIN — SCOLOPAMINE TRANSDERMAL SYSTEM 1 PATCH: 1 PATCH, EXTENDED RELEASE TRANSDERMAL at 09:57

## 2022-02-10 RX ADMIN — SODIUM CHLORIDE, POTASSIUM CHLORIDE, SODIUM LACTATE AND CALCIUM CHLORIDE 500 ML: 600; 310; 30; 20 INJECTION, SOLUTION INTRAVENOUS at 08:05

## 2022-02-10 RX ADMIN — LIDOCAINE HYDROCHLORIDE 100 MG: 20 INJECTION, SOLUTION EPIDURAL; INFILTRATION; INTRACAUDAL; PERINEURAL at 11:32

## 2022-02-10 RX ADMIN — Medication 8 MCG: at 11:40

## 2022-02-10 RX ADMIN — ROCURONIUM BROMIDE 20 MG: 10 INJECTION INTRAVENOUS at 13:10

## 2022-02-10 RX ADMIN — Medication 160 MG: at 11:32

## 2022-02-10 RX ADMIN — ONDANSETRON 4 MG: 2 INJECTION INTRAMUSCULAR; INTRAVENOUS at 14:08

## 2022-02-10 RX ADMIN — GLYCOPYRROLATE 0.3 MG: 0.2 INJECTION, SOLUTION INTRAMUSCULAR; INTRAVENOUS at 14:20

## 2022-02-10 RX ADMIN — SODIUM CHLORIDE, PRESERVATIVE FREE 10 ML: 5 INJECTION INTRAVENOUS at 21:08

## 2022-02-10 RX ADMIN — FENTANYL CITRATE 100 MCG: 50 INJECTION, SOLUTION INTRAMUSCULAR; INTRAVENOUS at 11:58

## 2022-02-10 RX ADMIN — PROPOFOL 135 MCG/KG/MIN: 10 INJECTION, EMULSION INTRAVENOUS at 13:47

## 2022-02-10 RX ADMIN — KETOROLAC TROMETHAMINE 30 MG: 30 INJECTION, SOLUTION INTRAMUSCULAR; INTRAVENOUS at 17:43

## 2022-02-10 RX ADMIN — FENTANYL CITRATE 100 MCG: 50 INJECTION, SOLUTION INTRAMUSCULAR; INTRAVENOUS at 11:32

## 2022-02-10 RX ADMIN — SODIUM CHLORIDE, POTASSIUM CHLORIDE, SODIUM LACTATE AND CALCIUM CHLORIDE 140 ML/HR: 600; 310; 30; 20 INJECTION, SOLUTION INTRAVENOUS at 16:18

## 2022-02-10 RX ADMIN — VASOPRESSIN 1 ML: 20 INJECTION INTRAVENOUS at 12:50

## 2022-02-10 RX ADMIN — GABAPENTIN 250 MG: 250 SOLUTION ORAL at 08:05

## 2022-02-10 RX ADMIN — CEFAZOLIN SODIUM 2 G: 10 INJECTION, POWDER, FOR SOLUTION INTRAVENOUS at 21:08

## 2022-02-10 RX ADMIN — ENOXAPARIN SODIUM 40 MG: 40 INJECTION SUBCUTANEOUS at 09:57

## 2022-02-10 RX ADMIN — PROPOFOL 150 MCG/KG/MIN: 10 INJECTION, EMULSION INTRAVENOUS at 12:43

## 2022-02-10 RX ADMIN — VASOPRESSIN 0.5 ML: 20 INJECTION INTRAVENOUS at 12:37

## 2022-02-10 RX ADMIN — SODIUM CHLORIDE, POTASSIUM CHLORIDE, SODIUM LACTATE AND CALCIUM CHLORIDE 1000 ML: 600; 310; 30; 20 INJECTION, SOLUTION INTRAVENOUS at 08:07

## 2022-02-10 RX ADMIN — ROCURONIUM BROMIDE 5 MG: 10 INJECTION INTRAVENOUS at 11:32

## 2022-02-10 RX ADMIN — SODIUM CHLORIDE, POTASSIUM CHLORIDE, SODIUM LACTATE AND CALCIUM CHLORIDE: 600; 310; 30; 20 INJECTION, SOLUTION INTRAVENOUS at 13:01

## 2022-02-10 RX ADMIN — ONDANSETRON 4 MG: 2 INJECTION INTRAMUSCULAR; INTRAVENOUS at 22:29

## 2022-02-10 RX ADMIN — LIDOCAINE HYDROCHLORIDE 1 EACH: 40 SOLUTION TOPICAL at 11:32

## 2022-02-10 RX ADMIN — SODIUM CHLORIDE, POTASSIUM CHLORIDE, SODIUM LACTATE AND CALCIUM CHLORIDE 1000 ML: 600; 310; 30; 20 INJECTION, SOLUTION INTRAVENOUS at 08:06

## 2022-02-10 RX ADMIN — ROCURONIUM BROMIDE 45 MG: 10 INJECTION INTRAVENOUS at 11:53

## 2022-02-10 RX ADMIN — Medication 8 MCG: at 13:59

## 2022-02-10 RX ADMIN — PROPOFOL 200 MG: 10 INJECTION, EMULSION INTRAVENOUS at 11:32

## 2022-02-10 RX ADMIN — Medication 3 MG: at 14:20

## 2022-02-10 RX ADMIN — PROPOFOL 150 MCG/KG/MIN: 10 INJECTION, EMULSION INTRAVENOUS at 11:35

## 2022-02-10 RX ADMIN — Medication 100 MCG: at 13:57

## 2022-02-10 RX ADMIN — Medication 2 G: at 11:40

## 2022-02-10 RX ADMIN — VASOPRESSIN 0.5 ML: 20 INJECTION INTRAVENOUS at 13:21

## 2022-02-10 RX ADMIN — FENTANYL CITRATE 100 MCG: 50 INJECTION, SOLUTION INTRAMUSCULAR; INTRAVENOUS at 14:29

## 2022-02-10 RX ADMIN — VASOPRESSIN 1 ML: 20 INJECTION INTRAVENOUS at 15:00

## 2022-02-10 NOTE — ANESTHESIA POSTPROCEDURE EVALUATION
Patient: Mary Hunt    Procedure Summary     Date: 02/10/22 Room / Location: [facility] OR  /  [facility] OR    Anesthesia Start: 1124 Anesthesia Stop: 1448    Procedures:       GASTRIC SLEEVE LAPAROSCOPIC WITH DAVINCI ROBOT, GASTRIC SLEEVE WITH HIATAL HERNIA LAPAROSCOPIC WITH DAVINCI ROBOT (N/A Abdomen)      LAPAROSCOPIC LYSIS OF ADHESIONS (N/A Abdomen) Diagnosis:       Class 3 severe obesity due to excess calories with serious comorbidity and body mass index (BMI) of 40.0 to 44.9 in adult ([facility])      Hiatal hernia      Adhesion of abdominal wall      (Class 3 severe obesity due to excess calories with serious comorbidity and body mass index (BMI) of 40.0 to 44.9 in adult (HCC) [E66.01, Z68.41])      (Hiatal hernia [K44.9])    Surgeons: Matthew Oh MD Provider: Delmar Oh CRNA    Anesthesia Type: general ASA Status: 3          Anesthesia Type: general    Vitals  Vitals Value Taken Time   /90 02/10/22 1611   Temp 97.6 °F (36.4 °C) 02/10/22 1611   Pulse 84 02/10/22 1611   Resp 18 02/10/22 1611   SpO2 98 % 02/10/22 1611           Post Anesthesia Care and Evaluation    Patient location during evaluation: PACU  Patient participation: complete - patient participated  Level of consciousness: awake and alert  Pain management: adequate  Airway patency: patent  Anesthetic complications: No anesthetic complications  PONV Status: none  Cardiovascular status: acceptable and hemodynamically stable  Respiratory status: acceptable  Hydration status: acceptable    Comments: Blood pressure 134/90, pulse 84, temperature 97.6 °F (36.4 °C), temperature source Axillary, resp. rate 18, weight 97.1 kg (214 lb 1.1 oz), SpO2 98 %, not currently breastfeeding.    Shortly after arrival to PACU I was called to bedside for hypotension. Pt was supine, sedated, BP 65-70 systolic, HR in 60s, abdomen soft, she appeared comfortable.  I gave 1u vasopressin and her blood pressure stabilized. I instructed the nurse to give 500 mL bolus  LR. Following this her blood pressure stabilized.

## 2022-02-10 NOTE — OP NOTE
Robotic assisted laparoscopic sleeve Gastrectomy     Pre operative diagnosis: Body mass index is 44.33 kg/m²., associated comorbidities of GERD and hiatal hernia    Post op diagnosis: as above intra-abdominal adhesions    Indications: The patient was admitted to the hospital with history of morbid obesity with a Body mass index is 44.33 kg/m².   she is scheduled for a Laparoscopic Sleeve Gastrectomy with robotic assistance.       Surgeon: Matthew Oh MD     Assistants: Sandro Dawkins    Anesthesia: General endotracheal anesthesia    ASA Class: 3, 4          Procedure Details         After the patient was explained the alternatives, benefits, complications, and risks of the robotic assisted laparoscopic sleeve gastrectomy informed consent was provided.  The patient was brought to the OR suite after receiving preoperative antibiotics medications and anticoagulation.  The patient was placed under general anesthesia.  The patient was then prepped and draped in standard fashion.  We performed a surgical Timeout.  An 18 Chinese OG tube was placed into the oropharynx by anesthesia followed by placement to suction.  I then proceeded to locally anesthetize Left upper quadrant site for placement of a 5 mm incision which was followed by placement of a 5 mm trocar into the abdominal cavity with camera assist.  I insufflated the abdominal cavity to a pressure of 15 mmHg.  This 5 mm trocar was my air seal trocar.  I noted abdominal adhesions but I was able to continue placing trochars accordingly prior to taking them down.  A 12 mm incision was made in the periumbilical midline location for placement of an 12 mm trocar under direct visualization, location approximately 28 cm from the sternal notch.  An 12 mm trocar was placed on the patient's right upper quadrant lateral to the periumbilical trocar under direct visualization as well as an 8 mm trocar which was placed in the left upper quadrant's lateral site along the same  plane under direct visualization this was then followed by placement of an 8 mm trocar in the right upper quadrant lateral to the site.   A 5 mm incision was placed in the subxiphoid location for placement of a 5 mm trocar under direct visualization.  I removed this trocar and replaced it with a Gibran liver retractor.  I noted immediately the hiatal hernia defect.  Once adequate exposure of the Hiatus and stomach was obtained, I then docked the robot to the trochars.  My robotic instruments were then placed under direct visualization into the surgical field.  I then went to the robotic console and began the procedure.  I then proceeded with taking down the intra-abdominal adhesions which took approximately 30 minutes.  Then I proceeded with dissecting the fat pad near the angle of Hiss away from the diaphragm.  I then proceeded with ligating the greater curvature vessels starting mid greater curvature working my way distally and proximally where my most distal ligation site was 6 cm from the pylorus.  I continued proximally along the greater curvature ligating the vessels as well as a short gastrics.  This was all accomplished using the vessel sealer.  I then followed by approaching the hiatal hernia defect from the lesser curvature side identifying the right crura muscle and bluntly dissecting inferior to the stomach and esophagus.  Of note prior to doing so I requested the OG tube to be removed and replaced with a 40 Cuban bougie.  I then followed by creating a window posteriorly to the esophagus and taking down the adhesions and hernia sac of the hiatal defect.  I then placed a Penrose drain around the esophagus and elevated it anteriorly to expose the right left crura muscles adequately.  From the greater curvature side and left crura side I took down more adhesions and circumferentially freed the esophagus so that it laid comfortably within the abdominal cavity without retracting or recoiling.  I then  "proceeded with reapproximating the right and left crura muscles.  This was performed posteriorly with first a V-Loc suture to reapproximate the right and left crura muscles and then securing it with nonabsorbable suture of Ethibond x2.  I reapproximated the anterior right and left crura muscles in a similar fashion with only utilizing Ethibond suture.  Once the hiatal hernia defect was repaired posteriorly I then proceeded with creating the sleeve gastrectomy.  I continued with ligating the vessels of the greater curvature and once completion of ligation of the vessels was obtained. I dissected away attachments found posteriorly to the stomach.  Care was made to assure no injury to the pancreas. Completion of my dissection was obtained once visualization of the posterior left crural muscle was seen.  I proceeded with marking the stomach in standard fashion.  Marking points a which was approximately 1-1/2 cm from the GE junction, point B which is approximately 3 cm from the angularis incisura and finally point C which is approximately 6 cm proximal from the pylorus.  The sleeve gastrectomy was then created.  I placed the \"clamp\" device fashioned based off of these points.  Once the clamp was secured assuring that these positions did not exceed the clamp device I fired my most distal load first after the OG tube was retracted proximally to assure it was not within the clamp.  I then reinserted the OG tube to fit into that small portion of the stomach near the prepyloric location/antrum.  Then completed the firing of the stapler device to create the sleeve gastrectomy hugging the clamp.  Again my most distal staple line was approximately 6 cm from the pylorus and I continued proximally along the greater curvature and assuring that the width from the angularis incisura was at least 3 cm.  My final proximal stapler was at least 1-2 cm from the GE junction.  The clamp was then removed safely from the peritoneal cavity " under direct visualization.  I then assessed the staple line to observe for hemostasis.  Once hemostasis was confirmed I then proceeded with my leak test.  Formed utilizing air which was insufflated into the sleeve by the OG tube and submerging the staple line under saline.  I observed for any evidence of bubbles or leakage.  There was no evidence of leakage or bubbles noted and I then proceeded with suctioning out the air from the sleeve.  Once there was no evidence of leakage I then requested that the bougie be placed off suction, given a small puff and removed under direct visualization.    I placed Surgicel snow on the staple line under direct visualization.  I then placed 2 clips on the distal third of the staple line due to small oozing.  This controlled hemostasis.  I then pexied the omentum to the proximal portion of the sleeve with 2-0 absorbable suture.  I reassess for hemostasis and then proceeded to scrub back into the case for removal of the resected portion of the stomach after the robotic instruments were removed under direct visualization.  The resected portion of the stomach was removed from the 12 mm trocar site under direct visualization.    The 12 mm trocar site's fascia was reapproximated using a Pietro Garza device and an 0 Vicryl suture.   The liver retractor was removed under direct visualization as well as the pneumoperitoneum and remaining trochars.  Then re-locally anesthetized skin wounds for closure.  Skin wounds were closed with 4-0 Monocryl.  Prior to closing skin wounds all lap pads and attachments were accounted for at the end of the procedure.      White staples: 4  Blue staples: 2         Findings: Intra-abdominal adhesions    Estimated Blood Loss:  minimal           Total IV Fluids: 1200 ml           Specimens:   Specimens       ID Source Type Tests Collected By Collected At Frozen?    A Gastric, Fundus Tissue TISSUE PATHOLOGY EXAM   Matthew Oh MD 2/10/22 7165 No    This  specimen was not marked as sent.                   Implants:   Implant Name Type Inv. Item Serial No.  Lot No. LRB No. Used Action   RELOAD STPLR SUREFORM 60 DAVINCI/X/XI 6ROW 2.5 WHT 1P/U - [id number] Implant RELOAD STPLR SUREFORM 60 DAVINCI/X/XI 6ROW 2.5 WHT 1P/U  INTUITIVE SURGICAL [id number] N/A 4 Implanted   RELOAD STPLR SUREFORM 60 DAVINCI/X/XI 6ROW 3.5 ANNY 1P/U - [id number] Implant RELOAD STPLR SUREFORM 60 DAVINCI/X/XI 6ROW 3.5 ANNY 1P/U  INTUITIVE SURGICAL [id number] N/A 2 Implanted   DEV CLS WND VLOC/90 CATHY ABS 1/2CIR SZ2/0 37MM 23CM AARON - [id number] Implant DEV CLS WND VLOC/90 CATHY ABS 1/2CIR SZ2/0 37MM 23CM AARON  COVIDIEN [id number] N/A 1 Implanted   HEMOST ABS SURGICEL SNOW 4X4IN - [id number] Implant HEMOST ABS SURGICEL SNOW 4X4IN  [employer]  DIV OF J AND J [id number] N/A 1 Implanted   RELOAD STPLR SUREFORM 45 DAVINCI/X/XI 6ROW 3.5 ANNY 1P/U - [id number] Implant RELOAD STPLR SUREFORM 45 DAVINCI/X/XI 6ROW 3.5 ANNY 1P/U  INTUITIVE SURGICAL [id number] N/A 1 Wasted   CLIPAPPLR M/ ENDO LIGAMAX5 5MM 33CM MD/LG - [id number] Implant CLIPAPPLR M/ ENDO LIGAMAX5 5MM 33CM MD/LG  [employer] ENDO SURGERY  DIV OF J AND J [id number] N/A 1 Implanted              Complications: None           Disposition: PACU - hemodynamically stable.           Condition: stable        Addendum:  After study, can the adhesions be further specified as:     Significant adhesions which required taking down for adequate exposure and the duration took approximately 30+ minutes.

## 2022-02-10 NOTE — BRIEF OP NOTE
GASTRIC SLEEVE WITH HIATAL HERNIA LAPAROSCOPIC WITH DAVINCI ROBOT  Progress Note    Mary Hunt  2/10/2022    Pre-op Diagnosis:   Class 3 severe obesity due to excess calories with serious comorbidity and body mass index (BMI) of 40.0 to 44.9 in adult ([facility]) [E66.01, Z68.41]  Hiatal hernia [K44.9]       Post-Op Diagnosis Codes:     * Class 3 severe obesity due to excess calories with serious comorbidity and body mass index (BMI) of 40.0 to 44.9 in adult ([facility]) [E66.01, Z68.41]     * Hiatal hernia [K44.9]    Procedure/CPT® Codes:        Procedure(s):  GASTRIC SLEEVE LAPAROSCOPIC WITH DAVINCI ROBOT, GASTRIC SLEEVE WITH HIATAL HERNIA LAPAROSCOPIC WITH DAVINCI ROBOT    Surgeon(s):  Matthew Oh MD    Anesthesia: General    Staff:   Circulator: Kristina Frazier RN; Leatha Berrios RN; Stefanie Nolasco RN; Lyudmila Foote RN  Scrub Person: Sherron Lopez; Sandro Lynn; Mc Lee; Sania Boyle         Estimated Blood Loss: minimal    Urine Voided: * No values recorded between 2/10/2022 11:23 AM and 2/10/2022  2:12 PM *    Specimens:                Specimens     ID Source Type Tests Collected By Collected At Frozen?    A Gastric, Fundus Tissue · TISSUE PATHOLOGY EXAM   Matthew Oh MD 2/10/22 1314 No    This specimen was not marked as sent.             Findings: hiatal hernia defect and adhesions    Complications: none          Matthew Oh MD     Date: 2/10/2022  Time: 14:22 CST

## 2022-02-10 NOTE — ANESTHESIA PREPROCEDURE EVALUATION
Anesthesia Evaluation     Patient summary reviewed   no history of anesthetic complications:  NPO Solid Status: > 8 hours             Airway   Mallampati: II  TM distance: >3 FB  Neck ROM: full  Dental          Pulmonary - negative pulmonary ROS   Cardiovascular - negative cardio ROS  Exercise tolerance: excellent (>7 METS)        Neuro/Psych- negative ROS    ROS Comment: Hydrocephalus s/p VPS  GI/Hepatic/Renal/Endo    (+) morbid obesity,      Musculoskeletal     Abdominal    Substance History      OB/GYN          Other                          Anesthesia Plan    ASA 3     general     intravenous induction     Anesthetic plan, all risks, benefits, and alternatives have been provided, discussed and informed consent has been obtained with: patient.

## 2022-02-10 NOTE — ANESTHESIA PROCEDURE NOTES
Airway  Urgency: elective    Date/Time: 2/10/2022 11:33 AM  Airway not difficult    General Information and Staff    Patient location during procedure: OR  CRNA: Delmar Oh CRNA    Indications and Patient Condition  Indications for airway management: airway protection    Preoxygenated: yes  Mask difficulty assessment: 1 - vent by mask    Final Airway Details  Final airway type: endotracheal airway      Successful airway: ETT  Cuffed: yes   Successful intubation technique: video laryngoscopy  Endotracheal tube insertion site: oral  Blade: Pulliam  Blade size: 3  ETT size (mm): 7.5  Cormack-Lehane Classification: grade I - full view of glottis  Placement verified by: chest auscultation, capnometry and palpation of cuff   Cuff volume (mL): 6  Measured from: lips  ETT/EBT  to lips (cm): 20  Number of attempts at approach: 1  Assessment: lips, teeth, and gum same as pre-op and atraumatic intubation               Patient was seen in office today by Dr. Bobby Oconnor (fever, wheezing, Tachypnea)     Pt received 2 Neb Treatments in office, Neb machine dispensed to parent to take home.      Per provider, please call parent and follow up on patient's respiratory symptoms this ev

## 2022-02-11 VITALS
RESPIRATION RATE: 20 BRPM | HEART RATE: 101 BPM | HEIGHT: 58 IN | BODY MASS INDEX: 44.93 KG/M2 | TEMPERATURE: 98 F | DIASTOLIC BLOOD PRESSURE: 55 MMHG | WEIGHT: 214.07 LBS | SYSTOLIC BLOOD PRESSURE: 122 MMHG | OXYGEN SATURATION: 93 %

## 2022-02-11 LAB
ALBUMIN SERPL-MCNC: 3.9 G/DL (ref 3.5–5.2)
ALBUMIN/GLOB SERPL: 1.3 G/DL
ALP SERPL-CCNC: 68 U/L (ref 39–117)
ALT SERPL W P-5'-P-CCNC: 115 U/L (ref 1–33)
ANION GAP SERPL CALCULATED.3IONS-SCNC: 10 MMOL/L (ref 5–15)
AST SERPL-CCNC: 121 U/L (ref 1–32)
BILIRUB SERPL-MCNC: 0.5 MG/DL (ref 0–1.2)
BUN SERPL-MCNC: 8 MG/DL (ref 6–20)
BUN/CREAT SERPL: 11.6 (ref 7–25)
CALCIUM SPEC-SCNC: 9.2 MG/DL (ref 8.6–10.5)
CHLORIDE SERPL-SCNC: 106 MMOL/L (ref 98–107)
CO2 SERPL-SCNC: 25 MMOL/L (ref 22–29)
CREAT SERPL-MCNC: 0.69 MG/DL (ref 0.57–1)
DEPRECATED RDW RBC AUTO: 39.9 FL (ref 37–54)
ERYTHROCYTE [DISTWIDTH] IN BLOOD BY AUTOMATED COUNT: 12.3 % (ref 12.3–15.4)
GFR SERPL CREATININE-BSD FRML MDRD: 109 ML/MIN/1.73
GLOBULIN UR ELPH-MCNC: 2.9 GM/DL
GLUCOSE SERPL-MCNC: 81 MG/DL (ref 65–99)
HCT VFR BLD AUTO: 36 % (ref 34–46.6)
HGB BLD-MCNC: 11 G/DL (ref 12–15.9)
MCH RBC QN AUTO: 27.1 PG (ref 26.6–33)
MCHC RBC AUTO-ENTMCNC: 30.6 G/DL (ref 31.5–35.7)
MCV RBC AUTO: 88.7 FL (ref 79–97)
PLATELET # BLD AUTO: 251 10*3/MM3 (ref 140–450)
PMV BLD AUTO: 11.1 FL (ref 6–12)
POTASSIUM SERPL-SCNC: 3.6 MMOL/L (ref 3.5–5.2)
PROT SERPL-MCNC: 6.8 G/DL (ref 6–8.5)
RBC # BLD AUTO: 4.06 10*6/MM3 (ref 3.77–5.28)
SODIUM SERPL-SCNC: 141 MMOL/L (ref 136–145)
WBC NRBC COR # BLD: 8.72 10*3/MM3 (ref 3.4–10.8)

## 2022-02-11 PROCEDURE — 25010000002 ONDANSETRON PER 1 MG: Performed by: SURGERY

## 2022-02-11 PROCEDURE — 25010000002 KETOROLAC TROMETHAMINE PER 15 MG: Performed by: SURGERY

## 2022-02-11 PROCEDURE — 85027 COMPLETE CBC AUTOMATED: CPT | Performed by: SURGERY

## 2022-02-11 PROCEDURE — 80053 COMPREHEN METABOLIC PANEL: CPT | Performed by: SURGERY

## 2022-02-11 PROCEDURE — 25010000002 ENOXAPARIN PER 10 MG: Performed by: SURGERY

## 2022-02-11 PROCEDURE — 25010000002 CEFAZOLIN PER 500 MG: Performed by: SURGERY

## 2022-02-11 RX ORDER — SIMETHICONE 80 MG
40 TABLET,CHEWABLE ORAL EVERY 6 HOURS PRN
Qty: 30 TABLET | Refills: 1 | Status: SHIPPED | OUTPATIENT
Start: 2022-02-11

## 2022-02-11 RX ORDER — ONDANSETRON 4 MG/1
4 TABLET, ORALLY DISINTEGRATING ORAL EVERY 8 HOURS PRN
Qty: 30 TABLET | Refills: 1 | Status: SHIPPED | OUTPATIENT
Start: 2022-02-11

## 2022-02-11 RX ORDER — OMEPRAZOLE 20 MG/1
20 CAPSULE, DELAYED RELEASE ORAL DAILY
Qty: 30 CAPSULE | Refills: 0 | Status: SHIPPED | OUTPATIENT
Start: 2022-02-11

## 2022-02-11 RX ADMIN — SODIUM CHLORIDE, POTASSIUM CHLORIDE, SODIUM LACTATE AND CALCIUM CHLORIDE 140 ML/HR: 600; 310; 30; 20 INJECTION, SOLUTION INTRAVENOUS at 02:15

## 2022-02-11 RX ADMIN — HYDROCODONE BITARTRATE AND ACETAMINOPHEN 10 ML: 7.5; 325 SOLUTION ORAL at 07:08

## 2022-02-11 RX ADMIN — ONDANSETRON 4 MG: 2 INJECTION INTRAMUSCULAR; INTRAVENOUS at 05:03

## 2022-02-11 RX ADMIN — SODIUM CHLORIDE, POTASSIUM CHLORIDE, SODIUM LACTATE AND CALCIUM CHLORIDE 140 ML/HR: 600; 310; 30; 20 INJECTION, SOLUTION INTRAVENOUS at 08:49

## 2022-02-11 RX ADMIN — CEFAZOLIN SODIUM 2 G: 10 INJECTION, POWDER, FOR SOLUTION INTRAVENOUS at 05:03

## 2022-02-11 RX ADMIN — ENOXAPARIN SODIUM 40 MG: 40 INJECTION SUBCUTANEOUS at 08:44

## 2022-02-11 RX ADMIN — KETOROLAC TROMETHAMINE 30 MG: 30 INJECTION, SOLUTION INTRAMUSCULAR; INTRAVENOUS at 01:47

## 2022-02-11 NOTE — DISCHARGE SUMMARY
"  Date of Discharge:  2/11/2022    Discharge Diagnosis: Class 3 severe obesity due to excess calories with serious comorbidity and body mass index (BMI) of 40.0 to 44.9 in adult ([facility]) [E66.01, Z68.41]  Hiatal hernia [K44.9]    Presenting Problem/History of Present Illness  Class 3 severe obesity due to excess calories with serious comorbidity and body mass index (BMI) of 40.0 to 44.9 in adult ([facility]) [E66.01, Z68.41]  Hiatal hernia [K44.9]       Hospital Course  Patient is a 51 y.o. female presented with morbid obesity and GERD.  She was noted to have a hiatal hernia preoperatively.  The patient's hospital course remained stable.  She tolerated the advancement of her diet on postoperative day 1.  She continued to ambulate and her pain was adequately controlled.  She was discharged home in a stable condition after postoperative instructions, medications and diet were reviewed prior to discharge.  The patient was instructed to follow-up for next visit accordingly and as scheduled.      Procedures Performed  Procedure(s):  GASTRIC SLEEVE LAPAROSCOPIC WITH DAVINCI ROBOT, GASTRIC SLEEVE WITH HIATAL HERNIA LAPAROSCOPIC WITH DAVINCI ROBOT  LAPAROSCOPIC LYSIS OF ADHESIONS       Consults:   Consults     No orders found for last 30 day(s).            Condition on Discharge:  Stable    Vital Signs  /55 (BP Location: Left arm, Patient Position: Sitting)   Pulse 101   Temp 98 °F (36.7 °C) (Oral)   Resp 20   Ht 148 cm (58.27\")   Wt 97.1 kg (214 lb 1.1 oz)   SpO2 93%   Breastfeeding No   BMI 44.33 kg/m²     Physical Exam:  General Appearance: alert, appears stated age and cooperative  Lungs: clear to auscultation, respirations regular, respirations even and respirations unlabored  Heart: regular rhythm & normal rate, normal S1, S2, no murmur, no gallop, no rub and no click  Abdomen: normal bowel sounds, no masses, no hepatomegaly, no splenomegaly, soft non-tender, no guarding and no rebound tenderness  Extremities: " moves extremities well, no edema, no cyanosis and no redness    Discharge Disposition      Discharge Medications     Discharge Medications      ASK your doctor about these medications      Instructions Start Date   cyclobenzaprine 5 MG tablet  Commonly known as: FLEXERIL   5 mg, Oral, 2 Times Daily PRN             Discharge Diet:     Activity at Discharge:     Follow-up Appointments  Future Appointments   Date Time Provider Department Center   2/16/2022  1:15 PM Matthew Oh MD [facility] [place]   3/14/2022 11:30 AM Matthew Oh MD [facility] [place]   5/10/2022 11:30 AM Matthew Oh MD [facility] [place]         Test Results Pending at Discharge  Pending Labs     Order Current Status    Tissue Pathology Exam In process           Matthew Oh MD  02/11/22  09:24 CST

## 2022-02-11 NOTE — PAYOR COMM NOTE
"Mary Beltran (51 y.o. Female) [id number]   D/C  Notification   D/C  Today 2/11    Psychiatric    Admit 2/10     ashley phone   Fax               Date of Birth Social Security Number Address Home Phone MRN    1970  [address] [place] [id number] [phone] [id number]    [employer] Marital Status             [facility]        Admission Date Admission Type Admitting Provider Attending Provider Department, Room/Bed    2/10/22 Elective Matthew Oh MD  [facility] 3C, 381/1    Discharge Date Discharge Disposition Discharge Destination          2/11/2022 Home or Self Care              Attending Provider: (none)   Allergies: No Known Allergies    Isolation: None   Infection: None   Code Status: CPR   Advance Care Planning Activity    Ht: 148 cm (58.27\")   Wt: 97.1 kg (214 lb 1.1 oz)    Admission Cmt: None   Principal Problem: None                Active Insurance as of 2/10/2022     Primary Coverage     Payor Plan Insurance Group Employer/Plan Group    [employer] MEDICA[employer] [facility] MEDICAID [id number]     Payor Plan Address Payor Plan Phone Number Payor Plan Fax Number Effective Dates    [address] [address] [phone]  9/1/2018 - None Entered    [facility] [id number]       Subscriber Name Subscriber Birth Date Member ID       MARY BELTRAN 1970 [id number]                 Emergency Contacts      (Rel.) Home Phone Work Phone Mobile Phone    Anson Beltran (Spouse) -- -- [phone]               Operative/Procedure Notes (last 48 hours)      Matthew Oh MD at 02/10/22 1155          GASTRIC SLEEVE WITH HIATAL HERNIA LAPAROSCOPIC WITH DAVINCI ROBOT  Progress Note    Mary Beltran  2/10/2022    Pre-op Diagnosis:   Class 3 severe obesity due to excess calories with serious comorbidity and body mass index (BMI) of 40.0 to 44.9 in adult (HCC) [E66.01, Z68.41]  Hiatal hernia [K44.9]       Post-Op Diagnosis Codes:     * " Class 3 severe obesity due to excess calories with serious comorbidity and body mass index (BMI) of 40.0 to 44.9 in adult ([facility]) [E66.01, Z68.41]     * Hiatal hernia [K44.9]    Procedure/CPT® Codes:        Procedure(s):  GASTRIC SLEEVE LAPAROSCOPIC WITH DAVINCI ROBOT, GASTRIC SLEEVE WITH HIATAL HERNIA LAPAROSCOPIC WITH DAVINCI ROBOT    Surgeon(s):  Matthew Oh MD    Anesthesia: General    Staff:   Circulator: Kristina Frazier RN; Leatha Berrios RN; Stefanie Nolasco RN; Lyudmila Foote, JUANCARLOS  Scrub Person: Sherron Lopez; Sandro Lynn; Mc Lee; Sania Boyle         Estimated Blood Loss: minimal    Urine Voided: * No values recorded between 2/10/2022 11:23 AM and 2/10/2022  2:12 PM *    Specimens:                Specimens     ID Source Type Tests Collected By Collected At Frozen?    A Gastric, Fundus Tissue · TISSUE PATHOLOGY EXAM   Matthew Oh MD 2/10/22 1314 No    This specimen was not marked as sent.             Findings: hiatal hernia defect and adhesions    Complications: none          Matthew Oh MD     Date: 2/10/2022  Time: 14:22 CST        Electronically signed by Matthew Oh MD at 02/10/22 1423     Matthew Oh MD at 02/10/22 1155            Robotic assisted laparoscopic sleeve Gastrectomy     Pre operative diagnosis: Body mass index is 44.33 kg/m²., associated comorbidities of GERD and hiatal hernia    Post op diagnosis: as above intra-abdominal adhesions    Indications: The patient was admitted to the hospital with history of morbid obesity with a Body mass index is 44.33 kg/m².   she is scheduled for a Laparoscopic Sleeve Gastrectomy with robotic assistance.       Surgeon: Matthew Oh MD     Assistants: Sandro Dawkins    Anesthesia: General endotracheal anesthesia    ASA Class: 3, 4          Procedure Details         After the patient was explained the alternatives, benefits, complications, and risks of the robotic assisted laparoscopic sleeve  gastrectomy informed consent was provided.  The patient was brought to the OR suite after receiving preoperative antibiotics medications and anticoagulation.  The patient was placed under general anesthesia.  The patient was then prepped and draped in standard fashion.  We performed a surgical Timeout.  An 18 Greenlandic OG tube was placed into the oropharynx by anesthesia followed by placement to suction.  I then proceeded to locally anesthetize Left upper quadrant site for placement of a 5 mm incision which was followed by placement of a 5 mm trocar into the abdominal cavity with camera assist.  I insufflated the abdominal cavity to a pressure of 15 mmHg.  This 5 mm trocar was my air seal trocar.  I noted abdominal adhesions but I was able to continue placing trochars accordingly prior to taking them down.  A 12 mm incision was made in the periumbilical midline location for placement of an 12 mm trocar under direct visualization, location approximately 28 cm from the sternal notch.  An 12 mm trocar was placed on the patient's right upper quadrant lateral to the periumbilical trocar under direct visualization as well as an 8 mm trocar which was placed in the left upper quadrant's lateral site along the same plane under direct visualization this was then followed by placement of an 8 mm trocar in the right upper quadrant lateral to the site.   A 5 mm incision was placed in the subxiphoid location for placement of a 5 mm trocar under direct visualization.  I removed this trocar and replaced it with a Gibran liver retractor.  I noted immediately the hiatal hernia defect.  Once adequate exposure of the Hiatus and stomach was obtained, I then docked the robot to the trochars.  My robotic instruments were then placed under direct visualization into the surgical field.  I then went to the robotic console and began the procedure.  I then proceeded with taking down the intra-abdominal adhesions which took approximately 30  minutes.  Then I proceeded with dissecting the fat pad near the angle of Hiss away from the diaphragm.  I then proceeded with ligating the greater curvature vessels starting mid greater curvature working my way distally and proximally where my most distal ligation site was 6 cm from the pylorus.  I continued proximally along the greater curvature ligating the vessels as well as a short gastrics.  This was all accomplished using the vessel sealer.  I then followed by approaching the hiatal hernia defect from the lesser curvature side identifying the right crura muscle and bluntly dissecting inferior to the stomach and esophagus.  Of note prior to doing so I requested the OG tube to be removed and replaced with a 40 Iranian bougie.  I then followed by creating a window posteriorly to the esophagus and taking down the adhesions and hernia sac of the hiatal defect.  I then placed a Penrose drain around the esophagus and elevated it anteriorly to expose the right left crura muscles adequately.  From the greater curvature side and left crura side I took down more adhesions and circumferentially freed the esophagus so that it laid comfortably within the abdominal cavity without retracting or recoiling.  I then proceeded with reapproximating the right and left crura muscles.  This was performed posteriorly with first a V-Loc suture to reapproximate the right and left crura muscles and then securing it with nonabsorbable suture of Ethibond x2.  I reapproximated the anterior right and left crura muscles in a similar fashion with only utilizing Ethibond suture.  Once the hiatal hernia defect was repaired posteriorly I then proceeded with creating the sleeve gastrectomy.  I continued with ligating the vessels of the greater curvature and once completion of ligation of the vessels was obtained. I dissected away attachments found posteriorly to the stomach.  Care was made to assure no injury to the pancreas. Completion of my  "dissection was obtained once visualization of the posterior left crural muscle was seen.  I proceeded with marking the stomach in standard fashion.  Marking points a which was approximately 1-1/2 cm from the GE junction, point B which is approximately 3 cm from the angularis incisura and finally point C which is approximately 6 cm proximal from the pylorus.  The sleeve gastrectomy was then created.  I placed the \"clamp\" device fashioned based off of these points.  Once the clamp was secured assuring that these positions did not exceed the clamp device I fired my most distal load first after the OG tube was retracted proximally to assure it was not within the clamp.  I then reinserted the OG tube to fit into that small portion of the stomach near the prepyloric location/antrum.  Then completed the firing of the stapler device to create the sleeve gastrectomy hugging the clamp.  Again my most distal staple line was approximately 6 cm from the pylorus and I continued proximally along the greater curvature and assuring that the width from the angularis incisura was at least 3 cm.  My final proximal stapler was at least 1-2 cm from the GE junction.  The clamp was then removed safely from the peritoneal cavity under direct visualization.  I then assessed the staple line to observe for hemostasis.  Once hemostasis was confirmed I then proceeded with my leak test.  Formed utilizing air which was insufflated into the sleeve by the OG tube and submerging the staple line under saline.  I observed for any evidence of bubbles or leakage.  There was no evidence of leakage or bubbles noted and I then proceeded with suctioning out the air from the sleeve.  Once there was no evidence of leakage I then requested that the bougie be placed off suction, given a small puff and removed under direct visualization.    I placed Surgicel snow on the staple line under direct visualization.  I then placed 2 clips on the distal third of the " staple line due to small oozing.  This controlled hemostasis.  I then pexied the omentum to the proximal portion of the sleeve with 2-0 absorbable suture.  I reassess for hemostasis and then proceeded to scrub back into the case for removal of the resected portion of the stomach after the robotic instruments were removed under direct visualization.  The resected portion of the stomach was removed from the 12 mm trocar site under direct visualization.    The 12 mm trocar site's fascia was reapproximated using a Pietro Garza device and an 0 Vicryl suture.   The liver retractor was removed under direct visualization as well as the pneumoperitoneum and remaining trochars.  Then re-locally anesthetized skin wounds for closure.  Skin wounds were closed with 4-0 Monocryl.  Prior to closing skin wounds all lap pads and attachments were accounted for at the end of the procedure.      White staples: 4  Blue staples: 2         Findings: Intra-abdominal adhesions    Estimated Blood Loss:  minimal           Total IV Fluids: 1200 ml           Specimens:   Specimens     ID Source Type Tests Collected By Collected At Frozen?    A Gastric, Fundus Tissue · TISSUE PATHOLOGY EXAM   Matthew Oh MD 2/10/22 1314 No    This specimen was not marked as sent.                 Implants:   Implant Name Type Inv. Item Serial No.  Lot No. LRB No. Used Action   RELOAD STPLR SUREFORM 60 DAVINCI/X/XI 6ROW 2.5 WHT 1P/U - [id number] Implant RELOAD STPLR SUREFORM 60 DAVINCI/X/XI 6ROW 2.5 WHT 1P/U  INTUITIVE SURGICAL [id number] N/A 4 Implanted   RELOAD STPLR SUREFORM 60 DAVINCI/X/XI 6ROW 3.5 ANNY 1P/U - [id number] Implant RELOAD STPLR SUREFORM 60 DAVINCI/X/XI 6ROW 3.5 ANNY 1P/U  INTUITIVE SURGICAL [id number] N/A 2 Implanted   DEV CLS WND VLOC/90 CATHY ABS 1/2CIR SZ2/0 37MM 23CM AARON - [id number] Implant DEV CLS WND VLOC/90 CATHY ABS 1/2CIR SZ2/0 37MM 23CM AARON  COVIDIEN [id number] N/A 1 Implanted   HEMOST ABS SURGICEL SNOW 4X4IN - [id number]  Implant HEMOST ABS SURGICEL SNOW 4X4IN  [employer]  DIV OF J AND J [id number] N/A 1 Implanted   RELOAD STPLR SUREFORM 45 DAVINCI/X/XI 6ROW 3.5 ANNY 1P/U - [id number] Implant RELOAD STPLR SUREFORM 45 DAVINCI/X/XI 6ROW 3.5 ANNY 1P/U  INTUITIVE SURGICAL [id number] N/A 1 Wasted   CLIPAPPLR M/ ENDO LIGAMAX5 5MM 33CM MD/LG - [id number] Implant CLIPAPPLR M/ ENDO LIGAMAX5 5MM 33CM MD/LG  ETHICON ENDO SURGERY  DIV OF J AND J [id number] N/A 1 Implanted              Complications: None           Disposition: PACU - hemodynamically stable.           Condition: stable    Electronically signed by Mtathew Oh MD at 02/10/22 1459       Discharge Order (From admission, onward)     Start     Ordered    02/11/22 0925  Discharge patient  Once        Expected Discharge Date: 02/11/22    Discharge Disposition: Home or Self Care    Physician of Record for Attribution - Please select from Treatment Team: MATTHEW OH [[id number]]    Review needed by CMO to determine Physician of Record: No       Question Answer Comment   Physician of Record for Attribution - Please select from Treatment Team MATTHEW OH    Review needed by CMO to determine Physician of Record No        02/11/22 2394

## 2022-02-11 NOTE — PLAN OF CARE
Goal Outcome Evaluation:  Plan of Care Reviewed With: patient  Progress: no change      Pt medicated x2 this shift with prn meds. IVF and IV abx infusing per orders. Scheduled nausea meds, no request for prn meds. Laps intact with dermabond and binder. Voiding per BRP. Up with SBA. Ambulated in multani. VSS. Safety maintained.

## 2022-02-11 NOTE — PLAN OF CARE
Goal Outcome Evaluation:  Plan of Care Reviewed With: patient           Outcome Summary: pt admitted from PACU; lap x7; abd binder in place; VSS; IVF; scop patch; c/o pain x1 denies nausea; safety maintained

## 2022-02-14 LAB
CYTO UR: NORMAL
LAB AP CASE REPORT: NORMAL
PATH REPORT.FINAL DX SPEC: NORMAL
PATH REPORT.GROSS SPEC: NORMAL

## 2022-02-15 ENCOUNTER — TELEPHONE (OUTPATIENT)
Dept: BARIATRICS/WEIGHT MGMT | Facility: CLINIC | Age: 52
End: 2022-02-15

## 2022-02-15 NOTE — TELEPHONE ENCOUNTER
:  [phone]         Patient doing: good        PROCEDURE INFORMATION:   Date of Procedure: 02/10/2022  Follow up appointment:  02/16/2022     1. Are you tolerating liquids? yes  Reason:      2. How many ounces of liquid are you getting per day? 30+     3. Are you ambulating well? yes     4. Do you have nausea or vomiting? no     5. How do your incisions look? ok                  6. Do you have any concerns?  no

## 2022-02-16 ENCOUNTER — OFFICE VISIT (OUTPATIENT)
Dept: BARIATRICS/WEIGHT MGMT | Facility: CLINIC | Age: 52
End: 2022-02-16

## 2022-02-16 VITALS
HEIGHT: 60 IN | HEART RATE: 82 BPM | TEMPERATURE: 98 F | BODY MASS INDEX: 41.9 KG/M2 | WEIGHT: 213.4 LBS | DIASTOLIC BLOOD PRESSURE: 73 MMHG | OXYGEN SATURATION: 98 % | SYSTOLIC BLOOD PRESSURE: 155 MMHG

## 2022-02-16 DIAGNOSIS — E66.01 CLASS 3 SEVERE OBESITY DUE TO EXCESS CALORIES WITH SERIOUS COMORBIDITY AND BODY MASS INDEX (BMI) OF 40.0 TO 44.9 IN ADULT: ICD-10-CM

## 2022-02-16 DIAGNOSIS — K44.9 HIATAL HERNIA: ICD-10-CM

## 2022-02-16 DIAGNOSIS — Z98.84 STATUS POST LAPAROSCOPIC SLEEVE GASTRECTOMY: Primary | ICD-10-CM

## 2022-02-16 PROCEDURE — 99024 POSTOP FOLLOW-UP VISIT: CPT | Performed by: SURGERY

## 2022-02-16 NOTE — PROGRESS NOTES
"  Patient Care Team:  Krishna Sanches MD as PCP - General (Family Medicine)    Subjective   Mary Hunt is a 51 y.o. female.     Mary is post op 1 week from her sleeve gastrectomy and hiatal hernia repair.  She is currently on her stage II diet.  Patient states that she is consuming approximately 40 ounces of fluid daily.  And 60 g of protein daily.  She is tolerating protein shakes and is walking 1 or 2 times a day.  She has had bowel movements and states she is doing well overall.    Review Of Systems:  General ROS: negative  Respiratory ROS: no cough, shortness of breath, or wheezing  Cardiovascular ROS: no chest pain or dyspnea on exertion  Gastrointestinal ROS: no abdominal pain, change in bowel habits, or black or bloody stools    The following portions of the patient's history were reviewed and updated as appropriate: allergies, current medications, past family history, past medical history, past social history, past surgical history and problem list.    Objective   /73 (BP Location: Right arm, Patient Position: Sitting, Cuff Size: Adult)   Pulse 82   Temp 98 °F (36.7 °C)   Ht 152.4 cm (60\")   Wt 96.8 kg (213 lb 6.4 oz)   SpO2 98%   BMI 41.68 kg/m²       02/16/22  1305   Weight: 96.8 kg (213 lb 6.4 oz)        General Appearance:  awake, alert, oriented, in no acute distress  Lungs:  Normal expansion.  Clear to auscultation.  No rales, rhonchi, or wheezing.  Heart:  Heart regular rate and rhythm  Abdomen:  Soft, non-tender, normal bowel sounds; no bruits, organomegaly or masses.  Abnormal shape: obese  Extremities: trace pedal edema and no calf pain  Wounds: clean, dry, intact    Assessment/Plan     Encounter Diagnoses   Name Primary?   • Status post laparoscopic sleeve gastrectomy Yes   • Class 3 severe obesity due to excess calories with serious comorbidity and body mass index (BMI) of 40.0 to 44.9 in adult ([facility])    • Hiatal hernia      Patient may start her multivitamins as " directed.  Patient may progress her diet according to our recommendations and as tolerated.  The patient and I discussed their weight restrictions which is defined as avoid lifting greater than 15 lbs for at least 4 weeks to allow healing of her tissue.  I also discussed the avoidance of driving or operating a motor vehicle if she requires, needs taking pain medication, or has a distracting injury or pain because of the risk of injuring herself or others.  The patient may advance diet as directed.    02/16/22  13:31 CST  Patient Care Team:  Krishna Sanches MD as PCP - General (Family Medicine)  Matthew Oh MD FACS

## 2022-03-14 ENCOUNTER — OFFICE VISIT (OUTPATIENT)
Dept: BARIATRICS/WEIGHT MGMT | Facility: CLINIC | Age: 52
End: 2022-03-14

## 2022-03-14 VITALS
HEIGHT: 60 IN | WEIGHT: 197.4 LBS | TEMPERATURE: 97.5 F | HEART RATE: 97 BPM | BODY MASS INDEX: 38.76 KG/M2 | OXYGEN SATURATION: 98 % | DIASTOLIC BLOOD PRESSURE: 82 MMHG | SYSTOLIC BLOOD PRESSURE: 141 MMHG

## 2022-03-14 DIAGNOSIS — Z98.84 STATUS POST LAPAROSCOPIC SLEEVE GASTRECTOMY: Primary | ICD-10-CM

## 2022-03-14 DIAGNOSIS — E66.01 CLASS 3 SEVERE OBESITY DUE TO EXCESS CALORIES WITH SERIOUS COMORBIDITY AND BODY MASS INDEX (BMI) OF 40.0 TO 44.9 IN ADULT: ICD-10-CM

## 2022-03-14 PROCEDURE — 99024 POSTOP FOLLOW-UP VISIT: CPT | Performed by: SURGERY

## 2022-03-14 RX ORDER — MULTIPLE VITAMINS W/ MINERALS TAB 9MG-400MCG
1 TAB ORAL DAILY
COMMUNITY

## 2022-03-14 NOTE — PROGRESS NOTES
"  Patient Care Team:  Krishna Sanches MD as PCP - General (Family Medicine)    Subjective   Mary Hunt is a 51 y.o. female.     Mary is post op 1 month from a sleeve gastrectomy procedure.  She is currently on her regular diet.  She states she is doing well overall.  She is consuming at least 64 ounces of fluid daily and eating 4 meals a day with portion sizes no greater than half a cup.  She consumes at least 60 g of protein daily.    Review Of Systems:  General ROS: negative  Gastrointestinal ROS: no abdominal pain, change in bowel habits, or black or bloody stools    The following portions of the patient's history were reviewed and updated as appropriate: allergies, current medications, past family history, past medical history, past social history, past surgical history and problem list.    Objective   /82 (BP Location: Right arm, Patient Position: Sitting, Cuff Size: Adult)   Pulse 97   Temp 97.5 °F (36.4 °C)   Ht 152.4 cm (60\")   Wt 89.5 kg (197 lb 6.4 oz)   SpO2 98%   BMI 38.55 kg/m²       03/14/22  1131   Weight: 89.5 kg (197 lb 6.4 oz)        General Appearance:  awake, alert, oriented, in no acute distress  Abdomen:  Soft, non-tender, normal bowel sounds; no bruits, organomegaly or masses.  Abnormal shape: obese  Wounds: clean, dry, intact    Assessment/Plan     Encounter Diagnoses   Name Primary?   • Status post laparoscopic sleeve gastrectomy Yes   • Class 3 severe obesity due to excess calories with serious comorbidity and body mass index (BMI) of 40.0 to 44.9 in adult ([facility])        Mary Hunt and I discussed the importance of changing behavior for consistent and successful weight loss.  We first reviewed again the definition of a meal which is defined as portion sizes less than a half a cup and those portions incorporating a protein.  Specifically the protein should fill half of that volume.  I also explained that she should attempt to consume 4 meals as defined above " daily and to avoid snacking or grazing.  She should also be mindful of adequate hydration by consuming at least 64 oz of water daily and incorporation of a regular exercise regimen.   I discussed the importance of taking her multivitamins as directed.  She is to return to our office 2 months or as needed.    03/14/22  13:09 CDT  Patient Care Team:  Krishna Sanches MD as PCP - General (Family Medicine)  Matthew Oh MD FACS

## 2022-05-10 ENCOUNTER — OFFICE VISIT (OUTPATIENT)
Dept: BARIATRICS/WEIGHT MGMT | Facility: CLINIC | Age: 52
End: 2022-05-10

## 2022-05-10 VITALS
DIASTOLIC BLOOD PRESSURE: 77 MMHG | HEART RATE: 85 BPM | WEIGHT: 191.6 LBS | BODY MASS INDEX: 37.62 KG/M2 | SYSTOLIC BLOOD PRESSURE: 116 MMHG | OXYGEN SATURATION: 98 % | TEMPERATURE: 97.8 F | HEIGHT: 60 IN

## 2022-05-10 DIAGNOSIS — Z98.84 STATUS POST LAPAROSCOPIC SLEEVE GASTRECTOMY: ICD-10-CM

## 2022-05-10 DIAGNOSIS — E66.01 CLASS 3 SEVERE OBESITY DUE TO EXCESS CALORIES WITH SERIOUS COMORBIDITY AND BODY MASS INDEX (BMI) OF 40.0 TO 44.9 IN ADULT: Primary | ICD-10-CM

## 2022-05-10 PROCEDURE — 99024 POSTOP FOLLOW-UP VISIT: CPT | Performed by: SURGERY

## 2022-05-10 NOTE — PROGRESS NOTES
"  Patient Care Team:  Krishna Sanches MD as PCP - General (Family Medicine)    Subjective   Mary Hunt is a 51 y.o. female.     Mary is post op 3 months from her sleeve gastrectomy procedure.  She is currently on her regular diet.  Patient states she is consuming at least 64 ounces of water and 60+ grams of protein daily.  She is ambulating on a regular basis.  She states she is doing well overall.    Review Of Systems:  General ROS: negative  Gastrointestinal ROS: no abdominal pain, change in bowel habits, or black or bloody stools  Genito-Urinary ROS: no dysuria, trouble voiding, or hematuria  positive for - urinary frequency/urgency    The following portions of the patient's history were reviewed and updated as appropriate: allergies, current medications, past family history, past medical history, past social history, past surgical history and problem list.    Objective   /77 (BP Location: Right arm, Patient Position: Sitting, Cuff Size: Adult)   Pulse 85   Temp 97.8 °F (36.6 °C)   Ht 152.4 cm (60\")   Wt 86.9 kg (191 lb 9.6 oz)   SpO2 98%   BMI 37.42 kg/m²       05/10/22  1119   Weight: 86.9 kg (191 lb 9.6 oz)        General Appearance:  awake, alert, oriented, in no acute distress  Abdomen:  Soft, non-tender, normal bowel sounds; no bruits, organomegaly or masses.  Abnormal shape: obese  Wounds: clean, dry, intact    ASSESSMENTPLAN    Encounter Diagnoses   Name Primary?   • Class 3 severe obesity due to excess calories with serious comorbidity and body mass index (BMI) of 40.0 to 44.9 in adult ([facility]) Yes   • Status post laparoscopic sleeve gastrectomy      Mary Hunt and I discussed the importance of changing behavior for consistent and successful weight loss.  We first reviewed again the definition of a meal which is defined as portion sizes less than a half a cup and those portions incorporating a vegetable.  Specifically the vegetable should fill half of that volume.  I also " explained that she should attempt to consume 4 meals as defined above daily and to avoid snacking or grazing.  She should also be mindful of adequate hydration by consuming at least 64 oz of water daily and incorporation of a regular exercise regimen.     I discussed the importance of taking her multivitamins as directed.  We discussed the importance to be aware of foods that might have an addictive component with their behavior.  Those that are considered addictive based off of how they are consumed should be avoided.  Specifically if there is limited nutritional value and the food choice it should be avoided and substituted with something that would provide more nutrition.    She is to return to our office 3 months or as needed.    05/10/22  11:26 CDT  Patient Care Team:  Krishna Sanches MD as PCP - General (Family Medicine)  Matthew Oh MD FACS

## 2022-08-10 ENCOUNTER — OFFICE VISIT (OUTPATIENT)
Dept: BARIATRICS/WEIGHT MGMT | Facility: CLINIC | Age: 52
End: 2022-08-10

## 2022-08-10 VITALS
HEART RATE: 63 BPM | DIASTOLIC BLOOD PRESSURE: 84 MMHG | OXYGEN SATURATION: 96 % | TEMPERATURE: 98 F | BODY MASS INDEX: 37.46 KG/M2 | WEIGHT: 190.8 LBS | SYSTOLIC BLOOD PRESSURE: 135 MMHG | HEIGHT: 60 IN

## 2022-08-10 DIAGNOSIS — Z98.84 STATUS POST LAPAROSCOPIC SLEEVE GASTRECTOMY: Primary | ICD-10-CM

## 2022-08-10 DIAGNOSIS — E66.01 CLASS 2 SEVERE OBESITY DUE TO EXCESS CALORIES WITH SERIOUS COMORBIDITY AND BODY MASS INDEX (BMI) OF 37.0 TO 37.9 IN ADULT: ICD-10-CM

## 2022-08-10 PROCEDURE — 99213 OFFICE O/P EST LOW 20 MIN: CPT | Performed by: NURSE PRACTITIONER

## 2022-08-10 NOTE — ASSESSMENT & PLAN NOTE
Patient's (Body mass index is 37.26 kg/m².) indicates that they are morbidly obese (BMI > 40 or > 35 with obesity - related health condition) with health conditions that include none . Weight is improving with treatment. BMI is is above average; BMI management plan is completed. We discussed portion control and increasing exercise.

## 2022-08-10 NOTE — PROGRESS NOTES
"Patient Care Team:  Krishna Sanches MD as PCP - General (Family Medicine)    Chief Complaint: S/P 6 months     Subjective   Mary Hunt is POD 6 months from a sleeve gastrectomy.  She states she has undergone a lot of personal stress due to her 's health. She admits to drinking 64 ounces of water or more each day.      Review of Systems:     Review of Systems   Constitutional: Positive for fatigue.   Respiratory: Negative.    Cardiovascular: Negative.    Gastrointestinal: Positive for constipation.   Endocrine: Negative.    Genitourinary: Positive for frequency.   Musculoskeletal: Negative.    Skin: Positive for rash.   Psychiatric/Behavioral: Negative.         Objective     Vital Signs  /84 (BP Location: Right arm, Patient Position: Sitting, Cuff Size: Adult)   Pulse 63   Temp 98 °F (36.7 °C)   Ht 152.4 cm (60\")   Wt 86.5 kg (190 lb 12.8 oz)   SpO2 96%   BMI 37.26 kg/m²     Physical Exam:    Physical Exam  Vitals reviewed.   Constitutional:       Appearance: She is obese.   Cardiovascular:      Rate and Rhythm: Normal rate and regular rhythm.   Pulmonary:      Effort: Pulmonary effort is normal.   Skin:     General: Skin is warm and dry.   Neurological:      Mental Status: She is alert and oriented to person, place, and time.   Psychiatric:         Mood and Affect: Mood normal.         Behavior: Behavior normal.           Results Review:    Labs reviewed    Medication Reviewed:   Current Outpatient Medications   Medication Sig Dispense Refill   • Calcium 500-100 MG-UNIT chewable tablet Chew 1 tablet Every 4 (Four) Hours As Needed.     • cyclobenzaprine (FLEXERIL) 5 MG tablet Take 5 mg by mouth 2 (Two) Times a Day As Needed for Muscle Spasms.     • multivitamin with minerals tablet tablet Take 1 tablet by mouth Daily.     • HYDROcodone-acetaminophen (HYCET) 7.5-325 MG/15ML solution Take 15 mL by mouth Every 6 (Six) Hours As Needed for Moderate Pain . 180 mL 0   • omeprazole (priLOSEC) " 20 MG capsule Take 1 capsule by mouth Daily. 30 capsule 0   • ondansetron ODT (Zofran ODT) 4 MG disintegrating tablet Place 1 tablet on the tongue Every 8 (Eight) Hours As Needed for Nausea or Vomiting. 30 tablet 1   • simethicone (Gas-X) 80 MG chewable tablet Chew 0.5 tablets Every 6 (Six) Hours As Needed for Flatulence (belching). 30 tablet 1     No current facility-administered medications for this visit.       Assessment & Plan  POD 2 1/2 years from  A sleeve gastrectomy.  Diagnoses and all orders for this visit:    1. Status post laparoscopic sleeve gastrectomy (Primary)  Comments:  Post operative meal plan reviewed with patient.    2. Class 2 severe obesity due to excess calories with serious comorbidity and body mass index (BMI) of 37.0 to 37.9 in adult (HCC)  Assessment & Plan:  Patient's (Body mass index is 37.26 kg/m².) indicates that they are morbidly obese (BMI > 40 or > 35 with obesity - related health condition) with health conditions that include none . Weight is improving with treatment. BMI is is above average; BMI management plan is completed. We discussed portion control and increasing exercise.          Mary Hunt and I discussed the importance of changing behavior for consistent and successful weight loss.  We first reviewed again the definition of a meal which is defined as portion sizes less than a half a cup and those portions incorporating a protein.  Specifically the protein should fill half of that volume.  I also explained that she should attempt to consume 4 meals as defined above daily and to avoid snacking or grazing.  She should also be mindful of adequate hydration by consuming at least 64 oz of water daily and incorporation of a regular exercise regimen.   I discussed the importance of taking her multivitamins as directed.  She is to return to our office in 1 month or as needed.    Patient will see dietitian in 1 month. I have encouraged patient to purchase multiple  measuring cups so she is always able to measure.  She is agreeable with plan.    VERENICE Grimaldo  08/10/22  14:18 CDT

## 2022-09-12 ENCOUNTER — OFFICE VISIT (OUTPATIENT)
Dept: BARIATRICS/WEIGHT MGMT | Facility: CLINIC | Age: 52
End: 2022-09-12

## 2022-09-12 VITALS — WEIGHT: 191.2 LBS | HEIGHT: 60 IN | BODY MASS INDEX: 37.54 KG/M2

## 2022-09-12 NOTE — PROGRESS NOTES
"Metabolic and Bariatric Surgery Adult Nutrition Assessment    Patient Name: Mary Hunt   YOB: 1970   MRN: [id number]     Assessment Date:  09/12/2022     Reason for Visit: Follow-up Nutrition Assessment     Treatment Pathway: Postoperative Gastric Sleeve Gastrectomy     Assessment    Anthropometrics   Wt Readings from Last 1 Encounters:   09/12/22 86.7 kg (191 lb 3.2 oz)     Ht Readings from Last 1 Encounters:   09/12/22 152.4 cm (60\")     BMI Readings from Last 1 Encounters:   09/12/22 37.34 kg/m²          Past Medical History:   Diagnosis Date   • Hydrocephalus (HCC)     shunt has been placed for 47 years   • Medical history non-contributory    • Panic attack       Past Surgical History:   Procedure Laterality Date   • ENDOSCOPY N/A 9/17/2021    Procedure: ESOPHAGOGASTRODUODENOSCOPY WITH ANESTHESIA;  Surgeon: Matthew Oh MD;  Location:  [facility];  Service: General;  Laterality: N/A;  pre: obesity  post: obesity. hiatal hernia.   Krishna Sanches MD   • GASTRIC SLEEVE LAPAROSCOPIC N/A 2/10/2022    Procedure: GASTRIC SLEEVE LAPAROSCOPIC WITH DAVINCI ROBOT, GASTRIC SLEEVE WITH HIATAL HERNIA LAPAROSCOPIC WITH DAVINCI ROBOT;  Surgeon: Matthew Oh MD;  Location:  [facility] OR;  Service: Robotics - DaVinci;  Laterality: N/A;   • LAPAROSCOPIC LYSIS OF ADHESIONS N/A 2/10/2022    Procedure: LAPAROSCOPIC LYSIS OF ADHESIONS;  Surgeon: Matthew Oh MD;  Location:  [facility] OR;  Service: Robotics - DaVinci;  Laterality: N/A;   • SHUNT EXTERNALIZATION     • TUBAL ABDOMINAL LIGATION        Current Outpatient Medications   Medication Sig Dispense Refill   • Calcium 500-100 MG-UNIT chewable tablet Chew 1 tablet Every 4 (Four) Hours As Needed.     • cyclobenzaprine (FLEXERIL) 5 MG tablet Take 5 mg by mouth 2 (Two) Times a Day As Needed for Muscle Spasms.     • HYDROcodone-acetaminophen (HYCET) 7.5-325 MG/15ML solution Take 15 mL by mouth Every 6 (Six) Hours As Needed for Moderate Pain . " 180 mL 0   • multivitamin with minerals tablet tablet Take 1 tablet by mouth Daily.     • omeprazole (priLOSEC) 20 MG capsule Take 1 capsule by mouth Daily. 30 capsule 0   • ondansetron ODT (Zofran ODT) 4 MG disintegrating tablet Place 1 tablet on the tongue Every 8 (Eight) Hours As Needed for Nausea or Vomiting. 30 tablet 1   • simethicone (Gas-X) 80 MG chewable tablet Chew 0.5 tablets Every 6 (Six) Hours As Needed for Flatulence (belching). 30 tablet 1     No current facility-administered medications for this visit.      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 Using air fryer mostly.   (M1) 1 boiled with estimated 1/2 cup broccoli & carrots  (M2) 1/1:30pm skinless chicken breast with cauliflower/carrot medley 1/2 cup total for the entire meal  (M3) 1/2 cup cauliflower/carrot medley without protein.   (M4) ate part of a piece of chicken with ~ cauliflower.   Snacking - grazing on carrots between meals.  Monitoring portions- yes, using measuring cups.   Calculating Protein- 68+ ounces daily estimating.   Drinking sugary/carbonated beverages- no  Fluid Intake- 64 ounces + daily. May use SF packets for flavoring.   Supplement Intake- drinks [employer]life protein shake.   MVI/B12/Calcium Citrate/Vitamin D- Yes.    Barriers: stress eating.     Exercise: starting at the gym this week.   Recommended increasing physical activity, beyond normal daily habits, gradually working to reach ~30 minutes daily.     Nutrition Intervention  Nutrition education and nutrition coaching for behavior change provided.  Strategies used included Comprehensive education, Motivational Interviewing , Problem Solving and Ongoing reinforcement  Reviewed nutritional needs for Postoperative Gastric Sleeve Gastrectomy. Including but not limited to daily bariatric or multi-vitamin daily, B12, Calcium Citrate with Vitamin D, Eat 4  meals per day at 1/2 c portions, , Half of all meals should be servings of vegetables., Protein goal: 65gms., 1 protein shake daily (discussed guidelines of compliant shakes), Eliminate snacks., Reduce fat, sugar, and/or salt in food choices., Choose more nutrient dense foods., Choose foods with increased fiber. and Monitor portion sizes using a food scale and/or measuring cup.   Self-monitoring strategies such as keeping a food journal (on paper or electronically) and calculating fluid/protein intake were discussed.     Goals  1. Have Four meals/daily.   2. Measure portion sizes of all foods with measuring cups.  3. Recognize stress eating and find a healthy outlet.   4. Try 1 new vegetable each week two ways.     Monitoring/Evaluation Plan  Anticipate follow up per program protocol. Continue collaboration of care with physician and treatment team.     Electronically signed by  Pamela Bruce RDN, LD  09/12/2022 10:26 CDT.

## 2022-11-09 ENCOUNTER — OFFICE VISIT (OUTPATIENT)
Dept: BARIATRICS/WEIGHT MGMT | Facility: CLINIC | Age: 52
End: 2022-11-09

## 2022-11-09 VITALS
SYSTOLIC BLOOD PRESSURE: 122 MMHG | DIASTOLIC BLOOD PRESSURE: 82 MMHG | HEIGHT: 60 IN | TEMPERATURE: 98.2 F | BODY MASS INDEX: 38.24 KG/M2 | HEART RATE: 91 BPM | OXYGEN SATURATION: 98 % | WEIGHT: 194.8 LBS

## 2022-11-09 DIAGNOSIS — F32.A DEPRESSION, UNSPECIFIED DEPRESSION TYPE: ICD-10-CM

## 2022-11-09 DIAGNOSIS — Z98.84 STATUS POST LAPAROSCOPIC SLEEVE GASTRECTOMY: Primary | ICD-10-CM

## 2022-11-09 PROCEDURE — 99213 OFFICE O/P EST LOW 20 MIN: CPT | Performed by: NURSE PRACTITIONER

## 2022-11-09 NOTE — PROGRESS NOTES
"Patient Care Team:  Krishna Sanches MD as PCP - General (Family Medicine)    Chief Complaint: S/P 9 months    Subjective     Mary Hunt is POD 9 months from a sleeve gastrectomy.  She admits to eating at least 4 meals per day but states that she does not measure all of her meals.  She states that she is exercising daily for a total of about 2 hours by walking in the morning and going to the gym in the evenings.  She denies soda intake and denies nicotine use.  Patient does admit some personal stressors that have caused her some depression.  She admits to drinking at least 64 ounces of fluid each day and eating around 68 g of protein.       Review of Systems:     Review of Systems   Constitutional: Negative.    Respiratory: Negative.    Cardiovascular: Negative.    Gastrointestinal: Negative.    Endocrine: Negative.    Musculoskeletal: Negative.    Psychiatric/Behavioral: Negative.  Positive for depressed mood.        Objective     Vital Signs  /82 (BP Location: Right arm, Patient Position: Sitting, Cuff Size: Adult)   Pulse 91   Temp 98.2 °F (36.8 °C)   Ht 152.4 cm (60\")   Wt 88.4 kg (194 lb 12.8 oz)   SpO2 98%   BMI 38.04 kg/m²     Physical Exam:    Physical Exam  Vitals reviewed.   Constitutional:       Appearance: She is obese.   Cardiovascular:      Rate and Rhythm: Normal rate and regular rhythm.   Pulmonary:      Effort: Pulmonary effort is normal.   Musculoskeletal:         General: Normal range of motion.   Skin:     General: Skin is warm and dry.   Neurological:      Mental Status: She is alert and oriented to person, place, and time.   Psychiatric:         Mood and Affect: Mood normal.         Behavior: Behavior normal.           Results Review:    Labs reviewed    Medication Reviewed:   Current Outpatient Medications   Medication Sig Dispense Refill   • Calcium 500-100 MG-UNIT chewable tablet Chew 1 tablet Every 4 (Four) Hours As Needed.     • cyclobenzaprine (FLEXERIL) 5 MG tablet " Take 5 mg by mouth 2 (Two) Times a Day As Needed for Muscle Spasms.     • multivitamin with minerals tablet tablet Take 1 tablet by mouth Daily.     • omeprazole (priLOSEC) 20 MG capsule Take 1 capsule by mouth Daily. 30 capsule 0   • simethicone (Gas-X) 80 MG chewable tablet Chew 0.5 tablets Every 6 (Six) Hours As Needed for Flatulence (belching). 30 tablet 1   • ondansetron ODT (Zofran ODT) 4 MG disintegrating tablet Place 1 tablet on the tongue Every 8 (Eight) Hours As Needed for Nausea or Vomiting. 30 tablet 1     No current facility-administered medications for this visit.       Assessment & Plan  POD 9 months from  A sleeve gastrectomy.  Diagnoses and all orders for this visit:    1. Status post laparoscopic sleeve gastrectomy (Primary)  Comments:  post operative meal plan reviewed   Orders:  -     Ambulatory Referral to Psychiatry    2. Depression, unspecified depression type  -     Ambulatory Referral to Psychiatry           Mary Hunt and I discussed the importance of changing behavior for consistent and successful weight loss.  We first reviewed again the definition of a meal which is defined as portion sizes less than a half a cup and those portions incorporating a protein.  Specifically the protein should fill half of that volume.  I also explained that she should attempt to consume 4 meals as defined above daily and to avoid snacking or grazing.  She should also be mindful of adequate hydration by consuming at least 64 oz of water daily and incorporation of a regular exercise regimen.   I discussed the importance of taking her multivitamins as directed.  She is to return to our office in 3 months or as needed.    I have placed referral for psychiatry so patient can have further evaluation of depression and some assistance with healthy coping mechanisms.  Patient is agreeable with plan.    Rosalia Mckeon, APRN  11/09/22  13:40 CST

## 2023-04-21 ENCOUNTER — OFFICE VISIT (OUTPATIENT)
Dept: SURGERY | Age: 53
End: 2023-04-21
Payer: MEDICAID

## 2023-04-21 VITALS
BODY MASS INDEX: 43.41 KG/M2 | HEIGHT: 57 IN | TEMPERATURE: 97.9 F | OXYGEN SATURATION: 93 % | WEIGHT: 201.2 LBS | HEART RATE: 78 BPM

## 2023-04-21 DIAGNOSIS — R92.0 MICROCALCIFICATIONS OF THE BREAST: ICD-10-CM

## 2023-04-21 DIAGNOSIS — R92.8 ABNORMAL MAMMOGRAM: Primary | ICD-10-CM

## 2023-04-21 PROCEDURE — 99203 OFFICE O/P NEW LOW 30 MIN: CPT | Performed by: PHYSICIAN ASSISTANT

## 2023-04-21 RX ORDER — MULTIPLE VITAMINS W/ MINERALS TAB 9MG-400MCG
1 TAB ORAL DAILY
COMMUNITY

## 2023-04-28 NOTE — PROGRESS NOTES
Subjective:      Patient ID: Chase Rubin is a 47 y.o. female. HPI  Ms. Sykes presents to establish care for an abnormal mammogram right breast demonstrating microcalcifications as well as an axillary lymph node with cortical thickening. Biopsy has been recommended. I have reviewed the reports. Chase Rubin is a 47 y.o. female with the following history as [facility] in [facility]: There are no problems to display for this patient. Current Outpatient Medications   Medication Sig Dispense Refill    Multiple Vitamins-Minerals (THERA M PLUS) TABS Take 1 tablet by mouth daily       No current facility-administered medications for this visit. Allergies: Patient has no known allergies. No past medical history on file. Past Surgical History:   Procedure Laterality Date    BARIATRIC SURGERY       Family History   Problem Relation Age of Onset    Breast Cancer Maternal Aunt      Social History     Tobacco Use    Smoking status: Never    Smokeless tobacco: Never   Substance Use Topics    Alcohol use: Not Currently       Review of Systems   All other systems reviewed and are negative. Objective:   Physical Exam  Constitutional:       Appearance: Normal appearance. Chest:   Breasts:     Right: No inverted nipple, mass or skin change. Left: No inverted nipple, mass or skin change. Skin:     General: Skin is warm and dry. Neurological:      General: No focal deficit present. Mental Status: She is alert and oriented to person, place, and time. Psychiatric:         Mood and Affect: Mood normal.         Behavior: Behavior normal.         Thought Content: Thought content normal.         Judgment: Judgment normal.       Assessment:       Diagnosis Orders   1.  Abnormal mammogram  US BIOPSY LYMPH NODE    RITA STEREO BREAST BX W LOC DEVICE 1ST LESION RIGHT      2. Microcalcifications of the breast  RITA STEREO BREAST BX W LOC DEVICE 1ST LESION RIGHT              Plan:      PLAN:  This will

## 2023-05-10 ENCOUNTER — HOSPITAL ENCOUNTER (OUTPATIENT)
Dept: WOMENS IMAGING | Age: 53
Discharge: HOME | End: 2023-05-10
Payer: MEDICAID

## 2023-05-10 DIAGNOSIS — R92.8 ABNORMAL MAMMOGRAM: ICD-10-CM

## 2023-05-10 DIAGNOSIS — R92.1 BREAST CALCIFICATIONS: ICD-10-CM

## 2023-05-10 DIAGNOSIS — R92.0 MICROCALCIFICATIONS OF THE BREAST: ICD-10-CM

## 2023-05-10 PROCEDURE — A4648 IMPLANTABLE TISSUE MARKER: HCPCS

## 2023-05-10 PROCEDURE — 2709999900 MAMMOGRAM POST BX CLIP PLACEMENT RIGHT

## 2023-05-10 PROCEDURE — 19082 BX BREAST ADD LESION STRTCTC: CPT

## 2023-05-10 PROCEDURE — 88305 TISSUE EXAM BY PATHOLOGIST: CPT

## 2023-05-10 PROCEDURE — 76642 ULTRASOUND BREAST LIMITED: CPT | Performed by: RADIOLOGY

## 2023-05-10 PROCEDURE — 88341 IMHCHEM/IMCYTCHM EA ADD ANTB: CPT

## 2023-05-10 PROCEDURE — 88342 IMHCHEM/IMCYTCHM 1ST ANTB: CPT

## 2023-05-10 PROCEDURE — 77065 DX MAMMO INCL CAD UNI: CPT | Performed by: RADIOLOGY

## 2023-05-10 PROCEDURE — 19081 BX BREAST 1ST LESION STRTCTC: CPT | Performed by: RADIOLOGY

## 2023-05-10 PROCEDURE — 76642 ULTRASOUND BREAST LIMITED: CPT

## 2023-05-11 ENCOUNTER — TELEPHONE (OUTPATIENT)
Dept: SURGERY | Age: 53
End: 2023-05-11

## 2023-05-11 DIAGNOSIS — D05.11 DUCTAL CARCINOMA IN SITU (DCIS) OF RIGHT BREAST: Primary | ICD-10-CM

## 2023-05-12 ENCOUNTER — TELEPHONE (OUTPATIENT)
Dept: OTHER | Age: 53
End: 2023-05-12

## 2023-05-12 DIAGNOSIS — Z80.3 FAMILY HISTORY OF BREAST CANCER IN FEMALE: ICD-10-CM

## 2023-05-12 DIAGNOSIS — D05.11 DUCTAL CARCINOMA IN SITU (DCIS) OF RIGHT BREAST: Primary | ICD-10-CM

## 2023-05-18 DIAGNOSIS — D05.11 DUCTAL CARCINOMA IN SITU (DCIS) OF RIGHT BREAST: Primary | ICD-10-CM

## 2023-05-18 RX ORDER — DIAZEPAM 5 MG/1
TABLET ORAL
Qty: 10 TABLET | Refills: 0 | Status: SHIPPED | OUTPATIENT
Start: 2023-05-18 | End: 2023-05-28

## 2023-05-23 ENCOUNTER — HOSPITAL ENCOUNTER (OUTPATIENT)
Dept: MRI IMAGING | Age: 53
Discharge: HOME | End: 2023-05-23

## 2023-05-23 DIAGNOSIS — D05.11 DUCTAL CARCINOMA IN SITU (DCIS) OF RIGHT BREAST: ICD-10-CM

## 2023-05-31 ENCOUNTER — INITIAL CONSULT (OUTPATIENT)
Dept: SURGERY | Age: 53
End: 2023-05-31

## 2023-05-31 DIAGNOSIS — D05.11 DUCTAL CARCINOMA IN SITU (DCIS) OF RIGHT BREAST: Primary | ICD-10-CM

## 2023-06-01 ENCOUNTER — TELEPHONE (OUTPATIENT)
Dept: OTHER | Age: 53
End: 2023-06-01

## 2023-06-01 NOTE — TELEPHONE ENCOUNTER
Pt states all questions were answered by Dr. Homar Lackey and she has to have a repeat US and then will discuss scheduling surgery. She is to call with any questions or concerns.

## 2023-06-06 ENCOUNTER — TELEPHONE (OUTPATIENT)
Dept: SURGERY | Age: 53
End: 2023-06-06

## 2023-06-06 RX ORDER — CYCLOBENZAPRINE HCL 5 MG
TABLET ORAL
COMMUNITY
Start: 2020-02-28

## 2023-06-06 RX ORDER — MEDROXYPROGESTERONE ACETATE 2.5 MG/1
TABLET ORAL
COMMUNITY
Start: 2023-06-03

## 2023-06-06 RX ORDER — MONTELUKAST SODIUM 10 MG/1
TABLET ORAL
COMMUNITY
Start: 2023-06-01

## 2023-06-06 RX ORDER — ESTRADIOL 2 MG/1
TABLET ORAL
COMMUNITY
Start: 2023-06-03

## 2023-06-06 NOTE — TELEPHONE ENCOUNTER
Please call an get an accurate medication list from patient. She only reported to us she was taking a multivitamin.

## 2023-06-06 NOTE — TELEPHONE ENCOUNTER
6/6/2023 Patient called and stated she was prescribe hormone and she is already taking hormones and is wanting to know should she be taking these all together or get off of some of them. She has been sleeping a lot and hard and is needing to know could this be from the hormones.     Callback # [phone]  alexandria

## 2023-06-06 NOTE — TELEPHONE ENCOUNTER
Please tell pt to stop estradiol and medroxyprogesterone. Had she told us she was taking those she would have been instructed to stop them when she was diagnosed with breast cancer. She should continue the tamoxifen and singulair. None of these medications should cause drowsiness but she can try taking them at bedtime and see if that helps.

## 2023-06-06 NOTE — TELEPHONE ENCOUNTER
Patient called back and I gave her medication instructions per Renu's message - pt voiced understanding

## 2023-06-06 NOTE — TELEPHONE ENCOUNTER
Tried to call patient and her mailbox is full so if she calls back can one you just rely this message to her please and thinks

## 2023-06-19 ENCOUNTER — HOSPITAL ENCOUNTER (OUTPATIENT)
Dept: WOMENS IMAGING | Age: 53
Discharge: HOME | End: 2023-06-19
Payer: MEDICAID

## 2023-06-19 ENCOUNTER — OFFICE VISIT (OUTPATIENT)
Dept: SURGERY | Age: 53
End: 2023-06-19
Payer: MEDICAID

## 2023-06-19 DIAGNOSIS — D05.11 DUCTAL CARCINOMA IN SITU (DCIS) OF RIGHT BREAST: Primary | ICD-10-CM

## 2023-06-19 DIAGNOSIS — D05.11 DUCTAL CARCINOMA IN SITU (DCIS) OF RIGHT BREAST: ICD-10-CM

## 2023-06-19 PROCEDURE — 76642 ULTRASOUND BREAST LIMITED: CPT | Performed by: GENERAL PRACTICE

## 2023-06-19 PROCEDURE — 76642 ULTRASOUND BREAST LIMITED: CPT

## 2023-06-19 PROCEDURE — 99214 OFFICE O/P EST MOD 30 MIN: CPT | Performed by: SURGERY

## 2023-06-19 NOTE — PROGRESS NOTES
HISTORY OF PRESENT ILLNESS:    Ms. 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 ER positive at 95%. MRI was not performed due to unknown  shunt    Unilateral Ultrasound-6/19/2023  FINDINGS: 2 surgical clips are seen within the right breast, one  within the retroareolar region and one within the periareolar region. Again seen within the right axillary region, there is an approximately  3.7 mm in thickness lymph node which remains essentially unchanged in  appearance from the prior exam.  No solid or cystic mass or area of architectural distortion is noted. I reviewed the images in real-time with the technologist.  The 2 biopsy clips in the right breast are sufficiently close to each other that a single lumpectomy should suffice. The lymph node of interest is not particularly worrisome and in the face of DCIS, I do not think it needs to be addressed. If we should find invasive tumor during her surgery we will return another day and address this. We had scheduled needle biopsy of this lymph node twice but she is so fearful of having anything done that we will deal with this under anesthesia only if we have to. DISCUSSION:  I had a lengthy discussion with Ms. Sykes  about the ramifications of the diagnosis of breast cancer. We discussed the pathophysiology of cancer in general and also the ways in which surgery, radiation therapy, and chemotherapy are utilized in the treatment of different types of cancers. We also explained how these modalities related to her situation in particular. We discussed the pathophysiology of breast cancer and some length, including what is known about the causes of breast cancer, its relationship to fibrocystic disease, its relationship to hormone replacement therapy, and some of the genetic aspects involved in familial breast cancers.  We discussed the

## 2023-06-23 ENCOUNTER — TELEPHONE (OUTPATIENT)
Dept: SURGERY | Age: 53
End: 2023-06-23

## 2023-06-29 DIAGNOSIS — D05.11 DUCTAL CARCINOMA IN SITU (DCIS) OF RIGHT BREAST: Primary | ICD-10-CM

## 2023-07-11 DIAGNOSIS — D05.11 DUCTAL CARCINOMA IN SITU (DCIS) OF RIGHT BREAST: Primary | ICD-10-CM

## 2023-08-17 ENCOUNTER — HOSPITAL ENCOUNTER (OUTPATIENT)
Dept: WOMENS IMAGING | Age: 53
Discharge: HOME | End: 2023-08-17
Attending: SURGERY
Payer: MEDICAID

## 2023-08-17 ENCOUNTER — HOSPITAL ENCOUNTER (OUTPATIENT)
Dept: PREADMISSION TESTING | Age: 53
End: 2023-08-17
Payer: MEDICAID

## 2023-08-17 VITALS — BODY MASS INDEX: 43.71 KG/M2 | WEIGHT: 202 LBS

## 2023-08-17 DIAGNOSIS — D05.11 DUCTAL CARCINOMA IN SITU (DCIS) OF RIGHT BREAST: ICD-10-CM

## 2023-08-17 LAB
BASOPHILS # BLD: 0 K/UL (ref 0–0.2)
BASOPHILS NFR BLD: 0.5 % (ref 0–1)
EKG P AXIS: 58 DEGREES
EKG P-R INTERVAL: 142 MS
EKG Q-T INTERVAL: 390 MS
EKG QRS DURATION: 84 MS
EKG QTC CALCULATION (BAZETT): 428 MS
EKG T AXIS: 12 DEGREES
EOSINOPHIL # BLD: 0.2 K/UL (ref 0–0.6)
EOSINOPHIL NFR BLD: 2.6 % (ref 0–5)
ERYTHROCYTE [DISTWIDTH] IN BLOOD BY AUTOMATED COUNT: 12.5 % (ref 11.5–14.5)
HCT VFR BLD AUTO: 38.8 % (ref 37–47)
HGB BLD-MCNC: 12.1 G/DL (ref 12–16)
IMM GRANULOCYTES # BLD: 0 K/UL
LYMPHOCYTES # BLD: 1.6 K/UL (ref 1.1–4.5)
LYMPHOCYTES NFR BLD: 22.1 % (ref 20–40)
MCH RBC QN AUTO: 28.5 PG (ref 27–31)
MCHC RBC AUTO-ENTMCNC: 31.2 G/DL (ref 33–37)
MCV RBC AUTO: 91.5 FL (ref 81–99)
MONOCYTES # BLD: 0.5 K/UL (ref 0–0.9)
MONOCYTES NFR BLD: 7.4 % (ref 0–10)
NEUTROPHILS # BLD: 4.9 K/UL (ref 1.5–7.5)
NEUTS SEG NFR BLD: 67.1 % (ref 50–65)
PLATELET # BLD AUTO: 271 K/UL (ref 130–400)
PMV BLD AUTO: 11 FL (ref 9.4–12.3)
RBC # BLD AUTO: 4.24 M/UL (ref 4.2–5.4)
WBC # BLD AUTO: 7.3 K/UL (ref 4.8–10.8)

## 2023-08-17 PROCEDURE — 85025 COMPLETE CBC W/AUTO DIFF WBC: CPT

## 2023-08-17 PROCEDURE — 93005 ELECTROCARDIOGRAM TRACING: CPT | Performed by: ANESTHESIOLOGY

## 2023-08-17 PROCEDURE — 76642 ULTRASOUND BREAST LIMITED: CPT

## 2023-08-17 RX ORDER — CELECOXIB 200 MG/1
200 CAPSULE ORAL ONCE
Status: CANCELLED | OUTPATIENT
Start: 2023-08-18

## 2023-08-17 RX ORDER — GABAPENTIN 300 MG/1
300 CAPSULE ORAL ONCE
Status: CANCELLED | OUTPATIENT
Start: 2023-08-18

## 2023-08-17 RX ORDER — ACETAMINOPHEN 325 MG/1
975 TABLET ORAL ONCE
Status: CANCELLED | OUTPATIENT
Start: 2023-08-18

## 2023-08-17 NOTE — DISCHARGE INSTRUCTIONS
The day before surgery you will receive a phone call from the surgery nurse to let you know what time to arrive on the day of surgery. This call will usually be between 2-4 PM. If you do not receive a phone call by 4 PM the day before your surgery please call [phone] and let them know you have not received an arrival time. If your surgery is on Monday, your call will be on the Friday before your Monday surgery. The morning of surgery, you may take all your prescribed medications with a sip of water. Any exceptions to this would be listed below:       Steve Ayala for the [facility]    A script for Bactroban ointment has been call to your pharmacy or was given to you in written form by your surgeon. The guidelines for the ointment use are as follows:    1)  Start using the ointment 7 days before your surgery date    2)  Use the ointment two times a day - morning and night    3)  Place the ointment on a Q-tip and swirl up in your nose making sure you cover completely       the skin just inside of each nostril. Use one end of the Q-tip for each nostril. Chlorhexidine Gluconate 4% Solution    Patient should shower with this soap  the night before surgery and the morning of surgery) washing from the neck down (avoiding contact with genitalia). DO  [facility] [address] YOUR HAIR OR FACE WITH THIS SOAP. When washing with this soap, apply enough to suds up the body thoroughly, turn the water away from your body and allow the soap suds to remain on the body for 2 full minutes, then rinse body completely. After using this soap on the body, please do not apply powders or lotions to your body. PREOPERATIVE GUIDELINES WHEN RECEIVING ANESTHESIA    Do not eat or drink anything after midnight, the night before your surgery. No gum or candy the morning of surgery. This is extremely important for your safety.     Take a bath (or shower) the night before your surgery and you may brush your teeth the morning of your surgery. You will be scheduled to arrive at the hospital 2 hours before your surgery, or follow your surgeon's instructions. Dress comfortably. Wear loose clothing that will be easy to remove and comfortable for your trip home. You may wear eyeglasses or contacts but bring your cases with you as they must be remove before your surgery. Hearing aids and dentures will need to be removed before your surgery. Do not wear any jewelry, including body jewelry. All jewelry will need to be removed prior to your surgery. Do not wear fingernail polish or make-up. It is best not to bring any valuables with you. If you are to stay in the hospital overnight, bring your robe, slippers and personal toiletries that you may need. POSTOPERATIVE GUIDELINES AFTER RECEIVING ANESTHESIA    If you are to go home after your surgery, you will need a responsible adult to drive you home. You will not be able to take public transportation after your discharge from the Operative Care Unit unless you are accompanied by a        responsible adult. On returning home, be sure to follow your physician's orders regarding diet, activity and medications. Remember, surgery with general anesthesia or sedation may leave you sleepy, very tired and with a decreased appetite for 12 to 24 hours. If you develop any post-surgical complications or problems, call your surgeon or [facility] ([phone]). [facility]-Revised 6-    Visitors for surgery patients are essential for the patient's emotional well-being and care       post operatively. 2.   Visitor Expectations and Limitations          3. One visitor allowed with patients in the preop/postop rooms. 4.  A second visitor may sit in the waiting area. 5.  No children under 13 allowed in the pre-post op areas unless they are the patient.     6.  Two people may be with an underage

## 2023-08-18 ENCOUNTER — ANESTHESIA (OUTPATIENT)
Dept: OPERATING ROOM | Age: 53
End: 2023-08-18
Payer: MEDICAID

## 2023-08-18 ENCOUNTER — HOSPITAL ENCOUNTER (OUTPATIENT)
Dept: ULTRASOUND IMAGING | Age: 53
Discharge: HOME | End: 2023-08-18
Attending: SURGERY
Payer: MEDICAID

## 2023-08-18 ENCOUNTER — HOSPITAL ENCOUNTER (OUTPATIENT)
Age: 53
Setting detail: OUTPATIENT SURGERY
Discharge: HOME | End: 2023-08-18
Attending: SURGERY | Admitting: SURGERY
Payer: MEDICAID

## 2023-08-18 ENCOUNTER — ANESTHESIA EVENT (OUTPATIENT)
Dept: OPERATING ROOM | Age: 53
End: 2023-08-18
Payer: MEDICAID

## 2023-08-18 VITALS
BODY MASS INDEX: 43.58 KG/M2 | WEIGHT: 202 LBS | HEIGHT: 57 IN | SYSTOLIC BLOOD PRESSURE: 115 MMHG | RESPIRATION RATE: 16 BRPM | HEART RATE: 88 BPM | OXYGEN SATURATION: 95 % | DIASTOLIC BLOOD PRESSURE: 75 MMHG | TEMPERATURE: 97 F

## 2023-08-18 DIAGNOSIS — D05.11 DUCTAL CARCINOMA IN SITU (DCIS) OF RIGHT BREAST: ICD-10-CM

## 2023-08-18 DIAGNOSIS — D05.11 INTRADUCTAL CARCINOMA IN SITU OF RIGHT BREAST: ICD-10-CM

## 2023-08-18 PROCEDURE — C1728 CATH, BRACHYTX SEED ADM: HCPCS | Performed by: SURGERY

## 2023-08-18 PROCEDURE — 2500000003 HC RX 250 WO HCPCS: Performed by: SURGERY

## 2023-08-18 PROCEDURE — 19340 INSJ BREAST IMPLT SM D MAST: CPT | Performed by: PHYSICIAN ASSISTANT

## 2023-08-18 PROCEDURE — 7100000000 HC PACU RECOVERY - FIRST 15 MIN: Performed by: SURGERY

## 2023-08-18 PROCEDURE — 19294 PREPJ TUM CAV IORT PRTL MAST: CPT | Performed by: SURGERY

## 2023-08-18 PROCEDURE — A4217 STERILE WATER/SALINE, 500 ML: HCPCS | Performed by: SURGERY

## 2023-08-18 PROCEDURE — 19297 PLACE BREAST CATH FOR RAD: CPT | Performed by: PHYSICIAN ASSISTANT

## 2023-08-18 PROCEDURE — 19340 INSJ BREAST IMPLT SM D MAST: CPT | Performed by: SURGERY

## 2023-08-18 PROCEDURE — 2709999900 HC NON-CHARGEABLE SUPPLY: Performed by: SURGERY

## 2023-08-18 PROCEDURE — 6370000000 HC RX 637 (ALT 250 FOR IP): Performed by: SURGERY

## 2023-08-18 PROCEDURE — 3700000000 HC ANESTHESIA ATTENDED CARE: Performed by: SURGERY

## 2023-08-18 PROCEDURE — 19301 PARTIAL MASTECTOMY: CPT | Performed by: SURGERY

## 2023-08-18 PROCEDURE — 88173 CYTOPATH EVAL FNA REPORT: CPT

## 2023-08-18 PROCEDURE — 76998 US GUIDE INTRAOP: CPT | Performed by: SURGERY

## 2023-08-18 PROCEDURE — 2720000010 HC SURG SUPPLY STERILE: Performed by: SURGERY

## 2023-08-18 PROCEDURE — 6360000002 HC RX W HCPCS: Performed by: NURSE ANESTHETIST, CERTIFIED REGISTERED

## 2023-08-18 PROCEDURE — 19297 PLACE BREAST CATH FOR RAD: CPT | Performed by: SURGERY

## 2023-08-18 PROCEDURE — 19301 PARTIAL MASTECTOMY: CPT | Performed by: PHYSICIAN ASSISTANT

## 2023-08-18 PROCEDURE — 88172 CYTP DX EVAL FNA 1ST EA SITE: CPT

## 2023-08-18 PROCEDURE — 6360000002 HC RX W HCPCS: Performed by: SURGERY

## 2023-08-18 PROCEDURE — 7100000010 HC PHASE II RECOVERY - FIRST 15 MIN: Performed by: SURGERY

## 2023-08-18 PROCEDURE — 2580000003 HC RX 258: Performed by: SURGERY

## 2023-08-18 PROCEDURE — 7100000011 HC PHASE II RECOVERY - ADDTL 15 MIN: Performed by: SURGERY

## 2023-08-18 PROCEDURE — 7100000001 HC PACU RECOVERY - ADDTL 15 MIN: Performed by: SURGERY

## 2023-08-18 PROCEDURE — A4648 IMPLANTABLE TISSUE MARKER: HCPCS | Performed by: SURGERY

## 2023-08-18 PROCEDURE — 19285 PERQ DEV BREAST 1ST US IMAG: CPT

## 2023-08-18 PROCEDURE — 2580000003 HC RX 258: Performed by: ANESTHESIOLOGY

## 2023-08-18 PROCEDURE — 19294 PREPJ TUM CAV IORT PRTL MAST: CPT | Performed by: PHYSICIAN ASSISTANT

## 2023-08-18 PROCEDURE — 3700000001 HC ADD 15 MINUTES (ANESTHESIA): Performed by: SURGERY

## 2023-08-18 PROCEDURE — 0546T RF SPECTRSC NTRAOP MRGN ASMT: CPT | Performed by: SURGERY

## 2023-08-18 PROCEDURE — 77424 IO RAD TX DELIVERY BY X-RAY: CPT | Performed by: SURGERY

## 2023-08-18 PROCEDURE — C1713 ANCHOR/SCREW BN/BN,TIS/BN: HCPCS | Performed by: SURGERY

## 2023-08-18 PROCEDURE — 3600000014 HC SURGERY LEVEL 4 ADDTL 15MIN: Performed by: SURGERY

## 2023-08-18 PROCEDURE — 3600000004 HC SURGERY LEVEL 4 BASE: Performed by: SURGERY

## 2023-08-18 DEVICE — THE MARKER IS A RADIOGRAPHIC IMPLANTABLE MARKER USED TO MARK SOFT TISSUE.IT IS COMPRISED OF A BIOABSORBABLE SPACER THAT HOLDS RADIOPAQUE MARKER CLIPS.
Type: IMPLANTABLE DEVICE | Site: BREAST | Status: FUNCTIONAL
Brand: BIOZORB MARKER

## 2023-08-18 RX ORDER — CEPHALEXIN 500 MG/1
500 CAPSULE ORAL 3 TIMES DAILY
Qty: 30 CAPSULE | Refills: 0 | Status: SHIPPED | OUTPATIENT
Start: 2023-08-18 | End: 2023-08-28

## 2023-08-18 RX ORDER — CELECOXIB 200 MG/1
200 CAPSULE ORAL ONCE
Status: COMPLETED | OUTPATIENT
Start: 2023-08-18 | End: 2023-08-18

## 2023-08-18 RX ORDER — HYDROMORPHONE HYDROCHLORIDE 1 MG/ML
0.5 INJECTION, SOLUTION INTRAMUSCULAR; INTRAVENOUS; SUBCUTANEOUS EVERY 5 MIN PRN
Status: DISCONTINUED | OUTPATIENT
Start: 2023-08-18 | End: 2023-08-18 | Stop reason: HOSPADM

## 2023-08-18 RX ORDER — PROPOFOL 10 MG/ML
INJECTION, EMULSION INTRAVENOUS CONTINUOUS PRN
Status: DISCONTINUED | OUTPATIENT
Start: 2023-08-18 | End: 2023-08-18 | Stop reason: SDUPTHER

## 2023-08-18 RX ORDER — SODIUM CHLORIDE 9 MG/ML
INJECTION, SOLUTION INTRAVENOUS PRN
Status: DISCONTINUED | OUTPATIENT
Start: 2023-08-18 | End: 2023-08-18 | Stop reason: HOSPADM

## 2023-08-18 RX ORDER — METOCLOPRAMIDE HYDROCHLORIDE 5 MG/ML
10 INJECTION INTRAMUSCULAR; INTRAVENOUS
Status: DISCONTINUED | OUTPATIENT
Start: 2023-08-18 | End: 2023-08-18 | Stop reason: HOSPADM

## 2023-08-18 RX ORDER — HYDROCODONE BITARTRATE AND ACETAMINOPHEN 5; 325 MG/1; MG/1
1 TABLET ORAL ONCE
Status: COMPLETED | OUTPATIENT
Start: 2023-08-18 | End: 2023-08-18

## 2023-08-18 RX ORDER — DIPHENHYDRAMINE HYDROCHLORIDE 50 MG/ML
12.5 INJECTION INTRAMUSCULAR; INTRAVENOUS
Status: DISCONTINUED | OUTPATIENT
Start: 2023-08-18 | End: 2023-08-18 | Stop reason: HOSPADM

## 2023-08-18 RX ORDER — HYDROMORPHONE HYDROCHLORIDE 1 MG/ML
0.25 INJECTION, SOLUTION INTRAMUSCULAR; INTRAVENOUS; SUBCUTANEOUS EVERY 5 MIN PRN
Status: DISCONTINUED | OUTPATIENT
Start: 2023-08-18 | End: 2023-08-18 | Stop reason: HOSPADM

## 2023-08-18 RX ORDER — MIDAZOLAM HYDROCHLORIDE 1 MG/ML
INJECTION INTRAMUSCULAR; INTRAVENOUS PRN
Status: DISCONTINUED | OUTPATIENT
Start: 2023-08-18 | End: 2023-08-18 | Stop reason: SDUPTHER

## 2023-08-18 RX ORDER — SODIUM CHLORIDE 0.9 % (FLUSH) 0.9 %
5-40 SYRINGE (ML) INJECTION PRN
Status: DISCONTINUED | OUTPATIENT
Start: 2023-08-18 | End: 2023-08-18 | Stop reason: HOSPADM

## 2023-08-18 RX ORDER — SODIUM CHLORIDE, SODIUM LACTATE, POTASSIUM CHLORIDE, CALCIUM CHLORIDE 600; 310; 30; 20 MG/100ML; MG/100ML; MG/100ML; MG/100ML
INJECTION, SOLUTION INTRAVENOUS CONTINUOUS
Status: DISCONTINUED | OUTPATIENT
Start: 2023-08-18 | End: 2023-08-18 | Stop reason: HOSPADM

## 2023-08-18 RX ORDER — FENTANYL CITRATE 50 UG/ML
INJECTION, SOLUTION INTRAMUSCULAR; INTRAVENOUS PRN
Status: DISCONTINUED | OUTPATIENT
Start: 2023-08-18 | End: 2023-08-18 | Stop reason: SDUPTHER

## 2023-08-18 RX ORDER — HYDROCODONE BITARTRATE AND ACETAMINOPHEN 5; 325 MG/1; MG/1
1 TABLET ORAL EVERY 6 HOURS PRN
Qty: 12 TABLET | Refills: 0 | Status: SHIPPED | OUTPATIENT
Start: 2023-08-18 | End: 2024-08-17

## 2023-08-18 RX ORDER — ACETAMINOPHEN 325 MG/1
975 TABLET ORAL ONCE
Status: COMPLETED | OUTPATIENT
Start: 2023-08-18 | End: 2023-08-18

## 2023-08-18 RX ORDER — GABAPENTIN 300 MG/1
300 CAPSULE ORAL ONCE
Status: COMPLETED | OUTPATIENT
Start: 2023-08-18 | End: 2023-08-18

## 2023-08-18 RX ORDER — SODIUM CHLORIDE 0.9 % (FLUSH) 0.9 %
5-40 SYRINGE (ML) INJECTION EVERY 12 HOURS SCHEDULED
Status: DISCONTINUED | OUTPATIENT
Start: 2023-08-18 | End: 2023-08-18 | Stop reason: HOSPADM

## 2023-08-18 RX ADMIN — CELECOXIB 200 MG: 200 CAPSULE ORAL at 07:06

## 2023-08-18 RX ADMIN — PROPOFOL 140 MCG/KG/MIN: 10 INJECTION, EMULSION INTRAVENOUS at 08:43

## 2023-08-18 RX ADMIN — MIDAZOLAM 2 MG: 1 INJECTION INTRAMUSCULAR; INTRAVENOUS at 08:35

## 2023-08-18 RX ADMIN — SODIUM CHLORIDE, POTASSIUM CHLORIDE, SODIUM LACTATE AND CALCIUM CHLORIDE: 600; 310; 30; 20 INJECTION, SOLUTION INTRAVENOUS at 07:06

## 2023-08-18 RX ADMIN — GABAPENTIN 300 MG: 300 CAPSULE ORAL at 07:06

## 2023-08-18 RX ADMIN — CEFAZOLIN 2000 MG: 2 INJECTION, POWDER, FOR SOLUTION INTRAMUSCULAR; INTRAVENOUS at 08:43

## 2023-08-18 RX ADMIN — HYDROCODONE BITARTRATE AND ACETAMINOPHEN 1 TABLET: 5; 325 TABLET ORAL at 12:09

## 2023-08-18 RX ADMIN — FENTANYL CITRATE 100 MCG: 0.05 INJECTION, SOLUTION INTRAMUSCULAR; INTRAVENOUS at 08:41

## 2023-08-18 RX ADMIN — ACETAMINOPHEN 975 MG: 325 TABLET ORAL at 07:06

## 2023-08-18 ASSESSMENT — PAIN DESCRIPTION - DESCRIPTORS
DESCRIPTORS: TENDER
DESCRIPTORS: TENDER

## 2023-08-18 ASSESSMENT — PAIN SCALES - GENERAL
PAINLEVEL_OUTOF10: 8
PAINLEVEL_OUTOF10: 8

## 2023-08-18 ASSESSMENT — LIFESTYLE VARIABLES: SMOKING_STATUS: 0

## 2023-08-18 ASSESSMENT — PAIN DESCRIPTION - LOCATION: LOCATION: BREAST

## 2023-08-18 ASSESSMENT — PAIN DESCRIPTION - ORIENTATION: ORIENTATION: RIGHT

## 2023-08-18 ASSESSMENT — PAIN - FUNCTIONAL ASSESSMENT: PAIN_FUNCTIONAL_ASSESSMENT: PREVENTS OR INTERFERES SOME ACTIVE ACTIVITIES AND ADLS

## 2023-08-18 NOTE — PROGRESS NOTES
CLINICAL PHARMACY NOTE: MEDS TO BEDS    Total # of Prescriptions Filled: 3   The following medications were delivered to the patient:  Discharge Medication List as of 8/18/2023 12:06 PM        START taking these medications    Details   HYDROcodone-acetaminophen (NORCO) 5-325 MG per tablet Take 1 tablet by mouth every 6 hours as needed for Pain. Max Daily Amount: 4 tablets, Disp-12 tablet, R-0Normal      cephALEXin (KEFLEX) 500 MG capsule Take 1 capsule by mouth 3 times daily for 10 days, Disp-30 capsule, R-0Normal           Naloxone nasal spray    Additional Documentation:    Delivered RX's to patient bedside. Also dispensed free naloxone. No copay.

## 2023-08-18 NOTE — OP NOTE
Operative Note        Radiation Oncology      Patient's Name/Date of Birth:    Neo Carlos / 1970 (47 y.o.)    DATE OF OPERATION:    08/18/2023    SURGEON:    Gilberto Wells MD  Phone Number: [phone]    OTHER SURGEONS:      Surgeon(s):  MD Gilberto Davis MD Herold Leaven, MD    PREOPERATIVE DIAGNOSIS:    D05.11    POSTOPERATIVE DIAGNOSIS:    D05.11    BRIEF HISTORY:    Ms. Jeancarlos Cuellar is a 47 y.o. female with a history of abnormal screening mammogram 1/13/2023 showing pleomorphic microcalcifications in the retroareolar area of the right breast.  Ultrasound of the right axilla showed a lymph node with slightly thickened cortex with right breast biopsy and fine-needle aspiration of the right axilla recommended. Limited ultrasound of the right breast performed 5/10/2023 showed a right axillary lymph node with some mild eccentric cortical thickening of 4 to 5 mm. The lymph node measured 2 cm in long axis. 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 Fine-needle aspiration of the right axillary lymph node had been planned on 2 separate occasions but canceled by the patient. The range of treatment options has been discussed with the patient by Dr. Tonie Bartlett and she has elected to proceed with right breast lumpectomy, fine-needle aspiration of the right axilla and consideration of intraoperative radiation therapy utilizing the xoft electronic brachytherapy system. Radiation aspects of this treatment were discussed with the patient and she gives informed consent to proceed. STAGE: 0 (Tis N0 M0)    OPERATION PERFORMED:    1. Right partial mastectomy. 2. Ultrasound examination of the right breast following insertion of the Xoft balloon applicator into the lumpectomy cavity. 3. Treatment planning. 4. Intraoperative irradiation using the Xoft electronic brachytherapy system.     DESCRIPTION

## 2023-08-18 NOTE — H&P
final pathology, the lymph node status, and the hormone receptor status. I discussed Oncotype Dx, Mammoprint, and Adjuvant Online as tools which aid in the decision for chemotherapy or hormonal therapy. We also discussed the possibility of breast reconstruction if a mastectomy was required. .  I explained to her the different techniques including placement of a subpectoral implant with a Alloderm sling  versus TRAM flap reconstruction as welll as other methods of reconstruction. She does not wish to pursue reconstruction at this time. Ultrasound-6/19/2023 with me in attendance  History: Known right breast cancer. EXAM: Sonographic exam of the right breast from 19 June 2023:  COMPARISON: Today's exam is compared to the prior right breast  ultrasound from 5/10/2023. FINDINGS: 2 surgical clips are seen within the right breast, one  within the retroareolar region and one within the periareolar region. Again seen within the right axillary region, there is an approximately  3.7 mm in thickness lymph node which remains essentially unchanged in  appearance from the prior exam.  No solid or cystic mass or area of architectural distortion is noted. IMPRESSION:  Known right breast malignancy. BI-RADS 6. Cathryn Bullock is a 47 y.o. female with the following history as recorded in [facility]:  Patient Active Problem List    Diagnosis Date Noted    Ductal carcinoma in situ (DCIS) of right breast 05/12/2023     Current Facility-Administered Medications   Medication Dose Route Frequency Provider Last Rate Last Admin    lactated ringers IV soln infusion   IntraVENous Continuous Gisele Calix  mL/hr at 08/18/23 0748 NoRateChange at 08/18/23 0748     Allergies: Patient has no known allergies.   Past Medical History:   Diagnosis Date    Cancer ([address]) 05/30/2023    Right Breast     Past Surgical History:   Procedure Laterality Date    BARIATRIC SURGERY  02/10/2010    ENDOSCOPY, COLON, DIAGNOSTIC      RITA STEREO

## 2023-08-18 NOTE — OP NOTE
[facility], [address] [place] [address]                                OPERATIVE REPORT    PATIENT NAME: Rona Devlin                   :        1970  MED REC NO:   [id number]                              ROOM:  ACCOUNT NO:   [de-identified]                           ADMIT DATE: 2023  PROVIDER:     Liliane Woods MD    DATE OF PROCEDURE:  2023    PREOPERATIVE DIAGNOSES:  DCIS, right breast x2 and right axillary  lymphadenopathy. POSTOPERATIVE DIAGNOSES:  DCIS, right breast x2 and right axillary  lymphadenopathy. PROCEDURES:  1. Ultrasound-guided needle localization x2.  2.  Placement of needle x2.  3.  Ultrasound-guided fine-needle aspiration, right axillary node. 4.  Right partial mastectomy. 5.  Utilization of MarginProbe intraoperative margin assessment device. 6.  Preparation of tumor site cavity for IORT. 7.  Placement of IORT catheter. 8.  Placement of three-dimensional implant for tissue filling and future  tumor site monitoring. SURGEON:  Liliane Woods MD    ASSISTANT:  Renard Hendricks PA-C; he was present for all portions of the  case including all critical portions as well as closure. ANESTHESIA:  Pec block with MAC. INDICATION:  The patient is a 56-year-old lady who had two separate  biopsies that demonstrated DCIS, one was retroareolar, the second was  retroareolar, but much closer to the chest wall. There was nothing  suspicious for invasive cancer, but there were calcifications between  the two areas, but they were all in this plane directly behind the  nipple extending back to the second clip. We discussed the risks and  benefits of lumpectomy and intraoperative radiation. She understood and  was agreeable. In addition, she has a somewhat enlarged right axillary  node.   We had scheduled her twice for fine-needle aspirate biopsy in the  clinic; however, she chickened out and preferred that we do the medial margin was clear, and the lateral margin had some  focal positivity, the deep margin was clear. We re-excised these  margins. We irrigated copiously, obtained good hemostasis. We then  sized the cavity with a 24-Yoruba Reich catheter with a 30 mL balloon. It appeared to be 90 to 473 mL, would certainly be close. We then  obtained the appropriate large catheter and filled it with sterile  water. We checked for symmetry which was good. We checked for balloon  integrity which was good. We then placed a 2-0 Prolene pursestring  around our incision. We then inserted the balloon, tied down our  pursestring. There was a little play in the catheter, so we placed  another 30 mL for a total 130 mL of fluid in the balloon. I then  reviewed the specimen with Pathology. It appeared that we had good  margins as far as naked eye could tell. She then underwent  uncomplicated intraoperative radiation therapy. At the conclusion of  the radiation therapy, the catheter was drawn, the pursestring was  removed. We irrigated copiously, obtained good hemostasis, placed a  three-dimensional implant in the tumor bed. We then placed a large  round Raphael-Simons drain and closed in multiple layers with 2-0 and 3-0  Vicryl and then a 4-0 Stratafix pursestring around the creating almost a  new nipple. Prineo dressing was applied. Estimated blood loss,  minimal.  Complications, none. She tolerated the procedure well.         Edilma Medina MD    D: 08/18/2023 12:13:53      T: 08/18/2023 12:19:38     [email or web address]  Job#: [id number]     Doc#: [id number]    CC:

## 2023-08-18 NOTE — DISCHARGE INSTRUCTIONS
1. Keep dressing on and dry. 2.Do not shower or take a tub bath until okay with doctor. 3. When the staples are removed, leave the small white strips of paper tape on the wound. Stapes are usually removed 3 weeks after surgery. (If applicable)  4. Follow exercises provided by the office. 5. Raphael-Simons Drain Care:         Measure and record drainage from the bulb every 12hrs. Wipe the cap of the bulb with alcohol before you open or close it to decrease chances of infection in the drainage tube. When the 24    hour total is less than 1 ounce or 30mL for one to two consecutive days, call the office to set up time for drain removal.   You should also \"strip\" or \"Milk\" the drain tubing before you empty it. This is done by pinching the tubing at the insertion site and pulling downward toward the bulb with your fingers or a pencil. This helps keep the tubing open. Clean the insertions site with peroxide when emptying the drainage and keep a gauze dressing over it. You may use Neosporin ointment or Betadine ointment around site if desired. Keep the drainage tubing taped to skin so the weight of the fluid in the bulb does not pull on the stitches holding the tube in place. 6. Do not drive until cleared by the doctor. You may ride in a car as needed. 7.Pain meds may cause constipation. If you become constipated, you may use one of the following over-the counter products: (a) Fleets enema as directed on package. (b) one bottle of   Magnesium Citrate as directed on package. (c) dulcolax tablets or suppositories as directed on package. If you have no results after using one or more of the products, call the office. 8. Call the office if you have any further questions during the nurse's hours, Monday through Friday, 9am to 4 pm. In case of an emergency after office hours, the answering service will take you call. 9. Office follow up in 1 week.  If an appointment was not made for you by the hospital staff,

## 2023-08-18 NOTE — BRIEF OP NOTE
Brief Postoperative Note      DATE OF PROCEDURE: 8/18/2023     SURGEON: Nanette Montana MD    PREOPERATIVE DIAGNOSIS:  Intraductal carcinoma in situ of right breast [D05.11]    POSTOPERATIVE DIAGNOSIS: Same     OPERATION: Procedure(s):  RIGHT LUMPECTOMY, PREOP US & INTRAOP US GUIDED NEEDLE LOC, US GUIDED FINE NEEDLE ASPIRATION RIGHT AXILLA, PEC BLOCK, BIOZORB, FLAPS, MARGIN PROBE WITH IORT  NEEDLE LOC x 2    ANESTHESIA: Monitor Anesthesia Care/ Pec block    ESTIMATED BLOOD LOSS: Minimal    COMPLICATIONS: None. SPECIMENS:   ID Type Source Tests Collected by Time Destination   A : right breast tissue Tissue Breast SURGICAL PATHOLOGY Nanette Montana MD 8/18/2023 0930    B : right breast tissue- additional cranial margin Tissue Breast SURGICAL PATHOLOGY Nanette Montana MD 8/18/2023 0932    C : right breast tissue- additional caudal margin Tissue Breast SURGICAL PATHOLOGY Nanette Montana MD 8/18/2023 0932    D : right breast tissue- additional lateral margin Tissue Breast SURGICAL PATHOLOGY Nanette Montana MD 8/18/2023 0932        DRAINS: MARCIO    The patient tolerated the procedure well.     Electronically signed by Nanette Montana MD  on 8/18/2023 at 10:52 AM

## 2023-08-18 NOTE — ANESTHESIA POSTPROCEDURE EVALUATION
Department of Anesthesiology  Postprocedure Note    Patient: Dimitris Zheng  MRN: [id number]  YOB: 1970  Date of evaluation: 8/18/2023      Procedure Summary     Date: 08/18/23 Room / Location: [address]    Anesthesia Start: 3642 Anesthesia Stop:     Procedure: RIGHT LUMPECTOMY, PREOP US & INTRAOP US GUIDED NEEDLE LOC, US GUIDED FINE NEEDLE ASPIRATION RIGHT AXILLA, PEC BLOCK, BIOZORB, FLAPS, MARGIN PROBE WITH IORT (Right: Breast) Diagnosis:       Intraductal carcinoma in situ of right breast      (Intraductal carcinoma in situ of right breast [D05.11])    Surgeons: Dinesh Fierro MD Responsible Provider: FAHAD Medina CRNA    Anesthesia Type: general, TIVA ASA Status: 2          Anesthesia Type: No value filed.     Hira Phase I:      Hira Phase II:        Anesthesia Post Evaluation    Patient location during evaluation: PACU  Patient participation: complete - patient participated  Level of consciousness: awake  Pain score: 0  Airway patency: patent  Nausea & Vomiting: no nausea and no vomiting  Complications: no  Cardiovascular status: hemodynamically stable  Respiratory status: acceptable and spontaneous ventilation  Hydration status: euvolemic  Pain management: adequate

## 2023-08-18 NOTE — CONSULTS
Radiation Oncology Consult Note    Requesting MD:  Perri Peace MD Admit Status:         History obtained from:   [x] Patient  [x] Other (specify): chart    CC: Ductal carcinoma in situ of right breast    HPI: Ms. Katty Marie is a 47 y.o. female with a history of abnormal screening mammogram 1/13/2023 showing pleomorphic microcalcifications in the retroareolar area of the right breast.  Ultrasound of the right axilla showed a lymph node with slightly thickened cortex with right breast biopsy and fine-needle aspiration of the right axilla recommended. Limited ultrasound of the right breast performed 5/10/2023 showed a right axillary lymph node with some mild eccentric cortical thickening of 4 to 5 mm. The lymph node measured 2 cm in long axis. 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 Fine-needle aspiration of the right axillary lymph node had been planned on 2 separate occasions but canceled by the patient. The range of treatment options has been discussed with the patient by Dr. Julio Frias and she has elected to proceed with right breast lumpectomy, fine-needle aspiration of the right axilla and consideration of intraoperative radiation therapy utilizing the xoft electronic brachytherapy system. Radiation aspects of this treatment were discussed with the patient and she gives informed consent to proceed. Past Medical History:   Diagnosis Date    Cancer ([address]) 05/30/2023    Right Breast     Menstrual history: The patient is G3, P2, Ab1. Menarche was at age 9. Menopause was 50. The patient did experience significant hot flashes following menopause. The patient did receive hormone replacement therapy for a brief period of time.     Past Surgical History:   Procedure Laterality Date    BARIATRIC SURGERY  02/10/2010    ENDOSCOPY, COLON, DIAGNOSTIC      RITA STEREO BREAST BX ADD LESION RIGHT Right 05/10/2023    RITA STEREO degrees    T Axis 12 degrees       IMAGING:  US BREAST LIMITED RIGHT    Result Date: 8/17/2023  Radiology exam is complete. No Radiologist dictation. Please follow up with ordering provider. IMPRESSION AND PLAN:  Stage: 0 (Tis, N0, M0)    After full discussion of treatment options, the patient has elected to proceed with right breast lumpectomy and possible fine needle aspiration of right axillary lymph node. This will be followed by consideration of intraoperative radiation therapy using the [facility] electronic brachytherapy system.        Jones Alamo MD    3/49/8614 7:38 AM

## 2023-08-21 ENCOUNTER — TELEPHONE (OUTPATIENT)
Dept: SURGERY | Age: 53
End: 2023-08-21

## 2023-08-21 ENCOUNTER — TELEPHONE (OUTPATIENT)
Dept: OTHER | Age: 53
End: 2023-08-21

## 2023-08-21 NOTE — TELEPHONE ENCOUNTER
Pt had surgery last Friday - Rt Lumpectomy/IORT and wants to know what her restrictions are and when she can go back to work    Cc:  Sarah Sandra PA-C

## 2023-08-21 NOTE — TELEPHONE ENCOUNTER
Pt asking if she could return to work tomorrow. 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 She just sits with her and makes sure she takes her medication. Family is needing a return to work letter. Will check with Dr. Barbra Norton. Pt is performing her arm exercises, keeping her bra on, voiding without difficulty and normal bowel movements. Her  will drive her to the home. Verified email address. Dr. Barbra Norton approved for her to return to work.

## 2023-08-22 NOTE — PROGRESS NOTES
HISTORY OF PRESENT ILLNESS:    Ms. Rose Luna presents for a one week post op following right partial mastectomy with IORT on 8/18/2023    She is recently status post stereotactic breast biopsy  on the right which revealed intermediate grade grade ductal carcinoma in situ with comedonecrosis. ER positive at 95%. MRI was not performed due to unknown  shunt    Unilateral Ultrasound-6/19/2023  FINDINGS: 2 surgical clips are seen within the right breast, one  within the retroareolar region and one within the periareolar region. Again seen within the right axillary region, there is an approximately  3.7 mm in thickness lymph node which remains essentially unchanged in  appearance from the prior exam.  No solid or cystic mass or area of architectural distortion is noted. I reviewed the images in real-time with the technologist.  The 2 biopsy clips in the right breast are sufficiently close to each other that a single lumpectomy should suffice. The lymph node of interest is not particularly worrisome and in the face of DCIS, I do not think it needs to be addressed. If we should find invasive tumor during her surgery we will return another day and address this. We had scheduled needle biopsy of this lymph node twice but she is so fearful of having anything done that we will deal with this under anesthesia only if we have to. PATHOLOGY REVEALS:  FINAL DIAGNOSIS:     A. Breast, right lumpectomy:   1. Intermediate grade ductal carcinoma in situ. 2.  In situ carcinoma extends to within 0.6 cm of the nearest caudal   surgical excision margin. 3.  Changes consistent with fibrocystic mastopathy involving   nonneoplastic breast parenchyma. 4.  Sections of skin and nipple, negative for evidence of malignancy. B.  Breast, excision of additional right breast cranial margin: Benign   breast parenchyma.      C.  Breast, excision of additional right breast caudal margin: Extensive   intermediate grade ductal

## 2023-08-24 ENCOUNTER — OFFICE VISIT (OUTPATIENT)
Dept: SURGERY | Age: 53
End: 2023-08-24

## 2023-08-24 VITALS — HEART RATE: 76 BPM | SYSTOLIC BLOOD PRESSURE: 110 MMHG | DIASTOLIC BLOOD PRESSURE: 70 MMHG

## 2023-08-24 DIAGNOSIS — D05.11 INTRADUCTAL CARCINOMA IN SITU OF RIGHT BREAST: Primary | ICD-10-CM

## 2023-08-24 DIAGNOSIS — Z98.890 STATUS POST RIGHT BREAST LUMPECTOMY: ICD-10-CM

## 2023-08-24 PROCEDURE — 99024 POSTOP FOLLOW-UP VISIT: CPT | Performed by: SURGERY

## 2023-08-28 PROBLEM — Z98.890 STATUS POST RIGHT BREAST LUMPECTOMY: Status: ACTIVE | Noted: 2023-08-28

## 2023-08-29 ENCOUNTER — TELEPHONE (OUTPATIENT)
Dept: SURGERY | Age: 53
End: 2023-08-29

## 2023-08-29 DIAGNOSIS — R21 RASH: Primary | ICD-10-CM

## 2023-08-29 RX ORDER — METHYLPREDNISOLONE 4 MG/1
TABLET ORAL
Qty: 1 TABLET | Refills: 0 | Status: SHIPPED | OUTPATIENT
Start: 2023-08-29 | End: 2023-09-04

## 2023-08-29 NOTE — TELEPHONE ENCOUNTER
Discussed with patient. Medications and recommendations given. This has been electronically signed by Diogo Solares.  Wolf Foreman PA-C.

## 2023-08-29 NOTE — PROGRESS NOTES
HISTORY OF PRESENT ILLNESS:    Ms. Jeison Hernandez presents for a one week post op following right partial mastectomy with IORT on 8/18/2023    She is recently status post stereotactic breast biopsy  on the right which revealed intermediate grade grade ductal carcinoma in situ with comedonecrosis. ER positive at 95%. MRI was not performed due to unknown  shunt    Unilateral Ultrasound-6/19/2023  FINDINGS: 2 surgical clips are seen within the right breast, one  within the retroareolar region and one within the periareolar region. Again seen within the right axillary region, there is an approximately  3.7 mm in thickness lymph node which remains essentially unchanged in  appearance from the prior exam.  No solid or cystic mass or area of architectural distortion is noted. I reviewed the images in real-time with the technologist.  The 2 biopsy clips in the right breast are sufficiently close to each other that a single lumpectomy should suffice. The lymph node of interest is not particularly worrisome and in the face of DCIS, I do not think it needs to be addressed. If we should find invasive tumor during her surgery we will return another day and address this. We had scheduled needle biopsy of this lymph node twice but she is so fearful of having anything done that we will deal with this under anesthesia only if we have to. PATHOLOGY REVEALS:  FINAL DIAGNOSIS:     A. Breast, right lumpectomy:   1. Intermediate grade ductal carcinoma in situ. 2.  In situ carcinoma extends to within 0.6 cm of the nearest caudal   surgical excision margin. 3.  Changes consistent with fibrocystic mastopathy involving   nonneoplastic breast parenchyma. 4.  Sections of skin and nipple, negative for evidence of malignancy. B.  Breast, excision of additional right breast cranial margin: Benign   breast parenchyma.      C.  Breast, excision of additional right breast caudal margin: Extensive   intermediate grade ductal

## 2023-08-29 NOTE — TELEPHONE ENCOUNTER
Patient requesting something for chemo burn - she states she mentioned it to Dr Landy Hudson at her last office appt 08/24/23 but he didn't tell her anything    Cc: Sunny Flynn MA

## 2023-08-31 ENCOUNTER — OFFICE VISIT (OUTPATIENT)
Dept: SURGERY | Age: 53
End: 2023-08-31

## 2023-08-31 VITALS — HEART RATE: 80 BPM | DIASTOLIC BLOOD PRESSURE: 60 MMHG | SYSTOLIC BLOOD PRESSURE: 110 MMHG

## 2023-08-31 DIAGNOSIS — Z98.890 STATUS POST RIGHT BREAST LUMPECTOMY: Primary | ICD-10-CM

## 2023-08-31 DIAGNOSIS — L08.9 WOUND INFECTION: Primary | ICD-10-CM

## 2023-08-31 DIAGNOSIS — T14.8XXA WOUND INFECTION: ICD-10-CM

## 2023-08-31 DIAGNOSIS — T14.8XXA WOUND INFECTION: Primary | ICD-10-CM

## 2023-08-31 DIAGNOSIS — D05.11 INTRADUCTAL CARCINOMA IN SITU OF RIGHT BREAST: ICD-10-CM

## 2023-08-31 DIAGNOSIS — L08.9 WOUND INFECTION: ICD-10-CM

## 2023-08-31 PROCEDURE — 99024 POSTOP FOLLOW-UP VISIT: CPT | Performed by: SURGERY

## 2023-08-31 RX ORDER — DOXYCYCLINE HYCLATE 100 MG
100 TABLET ORAL 2 TIMES DAILY
Qty: 28 TABLET | Refills: 0 | Status: SHIPPED | OUTPATIENT
Start: 2023-08-31 | End: 2023-09-14

## 2023-09-03 LAB
BACTERIA SPEC ANAEROBE CULT: ABNORMAL
BACTERIA SPEC ANAEROBE+AEROBE CULT: ABNORMAL
GRAM STN SPEC: ABNORMAL
ORGANISM: ABNORMAL

## 2023-09-06 ENCOUNTER — OFFICE VISIT (OUTPATIENT)
Dept: SURGERY | Age: 53
End: 2023-09-06

## 2023-09-06 VITALS — HEART RATE: 76 BPM | DIASTOLIC BLOOD PRESSURE: 70 MMHG | SYSTOLIC BLOOD PRESSURE: 124 MMHG

## 2023-09-06 DIAGNOSIS — D05.11 INTRADUCTAL CARCINOMA IN SITU OF RIGHT BREAST: ICD-10-CM

## 2023-09-06 DIAGNOSIS — Z98.890 STATUS POST RIGHT BREAST LUMPECTOMY: Primary | ICD-10-CM

## 2023-09-06 PROCEDURE — 99024 POSTOP FOLLOW-UP VISIT: CPT | Performed by: SURGERY

## 2023-09-06 RX ORDER — DOXYCYCLINE HYCLATE 100 MG
100 TABLET ORAL 2 TIMES DAILY
Qty: 28 TABLET | Refills: 0 | Status: SHIPPED | OUTPATIENT
Start: 2023-09-06 | End: 2023-09-20

## 2023-09-06 NOTE — PROGRESS NOTES
HISTORY OF PRESENT ILLNESS:    Ms. Cassandra Dewitt presents for a one week post op following right partial mastectomy with IORT on 8/18/2023. We pulled her drain last week but she had purulent drainage. Cultures grew MRSA sensitive to doxycycline. Her problem today is a blister related to tape over the site. 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 ER positive at 95%. MRI was not performed due to unknown  shunt    Unilateral Ultrasound-6/19/2023  FINDINGS: 2 surgical clips are seen within the right breast, one  within the retroareolar region and one within the periareolar region. Again seen within the right axillary region, there is an approximately  3.7 mm in thickness lymph node which remains essentially unchanged in  appearance from the prior exam.  No solid or cystic mass or area of architectural distortion is noted. I reviewed the images in real-time with the technologist.  The 2 biopsy clips in the right breast are sufficiently close to each other that a single lumpectomy should suffice. The lymph node of interest is not particularly worrisome and in the face of DCIS, I do not think it needs to be addressed. If we should find invasive tumor during her surgery we will return another day and address this. We had scheduled needle biopsy of this lymph node twice but she is so fearful of having anything done that we will deal with this under anesthesia only if we have to. PATHOLOGY REVEALS:  FINAL DIAGNOSIS:     A. Breast, right lumpectomy:   1. Intermediate grade ductal carcinoma in situ. 2.  In situ carcinoma extends to within 0.6 cm of the nearest caudal   surgical excision margin. 3.  Changes consistent with fibrocystic mastopathy involving   nonneoplastic breast parenchyma. 4.  Sections of skin and nipple, negative for evidence of malignancy.      B.  Breast, excision of

## 2023-09-08 NOTE — PROGRESS NOTES
HISTORY OF PRESENT ILLNESS:    Ms. Rupali Escobar presents for a one week post op following right partial mastectomy with IORT on 8/18/2023. We pulled her drain last week but she had purulent drainage. Cultures grew MRSA sensitive to doxycycline. Her problem today is a blister related to tape over the site. 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 ER positive at 95%. MRI was not performed due to unknown  shunt    Unilateral Ultrasound-6/19/2023  FINDINGS: 2 surgical clips are seen within the right breast, one  within the retroareolar region and one within the periareolar region. Again seen within the right axillary region, there is an approximately  3.7 mm in thickness lymph node which remains essentially unchanged in  appearance from the prior exam.  No solid or cystic mass or area of architectural distortion is noted. I reviewed the images in real-time with the technologist.  The 2 biopsy clips in the right breast are sufficiently close to each other that a single lumpectomy should suffice. The lymph node of interest is not particularly worrisome and in the face of DCIS, I do not think it needs to be addressed. If we should find invasive tumor during her surgery we will return another day and address this. We had scheduled needle biopsy of this lymph node twice but she is so fearful of having anything done that we will deal with this under anesthesia only if we have to. PATHOLOGY REVEALS:  FINAL DIAGNOSIS:     A. Breast, right lumpectomy:   1. Intermediate grade ductal carcinoma in situ. 2.  In situ carcinoma extends to within 0.6 cm of the nearest caudal   surgical excision margin. 3.  Changes consistent with fibrocystic mastopathy involving   nonneoplastic breast parenchyma. 4.  Sections of skin and nipple, negative for evidence of malignancy.      B.  Breast, excision of

## 2023-09-18 ENCOUNTER — OFFICE VISIT (OUTPATIENT)
Dept: SURGERY | Age: 53
End: 2023-09-18

## 2023-09-18 VITALS — DIASTOLIC BLOOD PRESSURE: 70 MMHG | SYSTOLIC BLOOD PRESSURE: 128 MMHG | HEART RATE: 80 BPM

## 2023-09-18 DIAGNOSIS — Z98.890 STATUS POST RIGHT BREAST LUMPECTOMY: Primary | ICD-10-CM

## 2023-09-18 PROCEDURE — 99024 POSTOP FOLLOW-UP VISIT: CPT | Performed by: SURGERY

## 2023-09-29 NOTE — PROGRESS NOTES
HISTORY OF PRESENT ILLNESS:    Ms. sEdras Camara presents for a  post op following right partial mastectomy with IORT on 8/18/2023. We pulled her drain last week but she had purulent drainage. Cultures grew MRSA sensitive to doxycycline. Her problem today is a blister related to tape over the site. 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 ER positive at 95%. MRI was not performed due to unknown  shunt    Unilateral Ultrasound-6/19/2023  FINDINGS: 2 surgical clips are seen within the right breast, one  within the retroareolar region and one within the periareolar region. Again seen within the right axillary region, there is an approximately  3.7 mm in thickness lymph node which remains essentially unchanged in  appearance from the prior exam.  No solid or cystic mass or area of architectural distortion is noted. I reviewed the images in real-time with the technologist.  The 2 biopsy clips in the right breast are sufficiently close to each other that a single lumpectomy should suffice. The lymph node of interest is not particularly worrisome and in the face of DCIS, I do not think it needs to be addressed. If we should find invasive tumor during her surgery we will return another day and address this. We had scheduled needle biopsy of this lymph node twice but she is so fearful of having anything done that we will deal with this under anesthesia only if we have to. PATHOLOGY REVEALS:  FINAL DIAGNOSIS:     A. Breast, right lumpectomy:   1. Intermediate grade ductal carcinoma in situ. 2.  In situ carcinoma extends to within 0.6 cm of the nearest caudal   surgical excision margin. 3.  Changes consistent with fibrocystic mastopathy involving   nonneoplastic breast parenchyma. 4.  Sections of skin and nipple, negative for evidence of malignancy.      B.  Breast, excision of additional

## 2023-10-02 ENCOUNTER — OFFICE VISIT (OUTPATIENT)
Dept: SURGERY | Age: 53
End: 2023-10-02

## 2023-10-02 VITALS — HEART RATE: 88 BPM | DIASTOLIC BLOOD PRESSURE: 78 MMHG | SYSTOLIC BLOOD PRESSURE: 130 MMHG

## 2023-10-02 DIAGNOSIS — Z98.890 STATUS POST RIGHT BREAST LUMPECTOMY: Primary | ICD-10-CM

## 2023-10-02 DIAGNOSIS — D05.11 INTRADUCTAL CARCINOMA IN SITU OF RIGHT BREAST: ICD-10-CM

## 2023-10-02 PROCEDURE — 99024 POSTOP FOLLOW-UP VISIT: CPT | Performed by: SURGERY

## 2023-10-03 RX ORDER — TAMOXIFEN CITRATE 20 MG/1
TABLET ORAL
COMMUNITY
Start: 2023-09-30

## 2023-10-06 DIAGNOSIS — D05.11 INTRADUCTAL CARCINOMA IN SITU OF RIGHT BREAST: Primary | ICD-10-CM

## 2023-10-06 DIAGNOSIS — Z98.890 STATUS POST RIGHT BREAST LUMPECTOMY: ICD-10-CM

## 2023-11-01 ENCOUNTER — TELEPHONE (OUTPATIENT)
Dept: SURGERY | Age: 53
End: 2023-11-01

## 2023-11-01 NOTE — TELEPHONE ENCOUNTER
[facility] for [place] shot. This has been electronically signed by Junaid Pace.  Lulú Domingo PA-C.

## 2023-11-01 NOTE — TELEPHONE ENCOUNTER
11/1/2023 Patient called and had a couple questions she needs answered. Can she get COVID vaccine since she had cancer? Also is she needing to be seen before she loses Medicaid after [place]?     Contact # [phone]  alexandria

## 2023-12-07 ENCOUNTER — TELEPHONE (OUTPATIENT)
Dept: SURGERY | Age: 53
End: 2023-12-07

## 2023-12-07 DIAGNOSIS — D05.11 INTRADUCTAL CARCINOMA IN SITU OF RIGHT BREAST: Primary | ICD-10-CM

## 2023-12-07 RX ORDER — TAMOXIFEN CITRATE 20 MG/1
20 TABLET ORAL DAILY
Qty: 90 TABLET | Refills: 3 | Status: SHIPPED | OUTPATIENT
Start: 2023-12-07

## 2024-01-03 ENCOUNTER — TELEPHONE (OUTPATIENT)
Dept: SURGERY | Age: 54
End: 2024-01-03

## 2024-01-03 DIAGNOSIS — Z98.890 STATUS POST RIGHT BREAST LUMPECTOMY: Primary | ICD-10-CM

## 2024-01-03 RX ORDER — MONTELUKAST SODIUM 10 MG/1
10 TABLET ORAL NIGHTLY
Qty: 90 TABLET | Refills: 3 | Status: SHIPPED | OUTPATIENT
Start: 2024-01-03

## 2024-01-03 NOTE — TELEPHONE ENCOUNTER
I will get this scheduled    Additional Notes: Pt aware of cosmetic fee if skin tags do not go away with freeze. Detail Level: Simple Render Risk Assessment In Note?: no

## 2024-01-03 NOTE — TELEPHONE ENCOUNTER
1/3/2024 Patient called returning missed call. Need appt for February for follow up and mammogram for 6 mo following lumpectomy.    Callback # [phone]  alexandria

## 2024-01-29 NOTE — PROGRESS NOTES
HISTORY OF PRESENT ILLNESS:    Ms. Sykes presents today for a 6 month breast exam following right partial mastectomy with IORT on 8/18/2023.  We pulled her drain last week but she had purulent drainage.  Cultures grew MRSA sensitive to doxycycline.  Her problem today is a blister related to tape over the site.    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 ER positive at 95%.     MRI was not performed due to unknown  shunt    Bilateral Mammogram-2/19/2024  There are expected postsurgical changes in the central posterior right breast.  There are residual amorphous calcifications adjacent to the lumpectomy site in the outer central right breast at middle depth spanning up to 2.2 cm which are indeterminate   for residual DCIS.     There are no focal suspicious masses, areas of architectural distortion, or suspicious calcifications in the left breast.     IMPRESSION:  1.  Indeterminate residual right breast calcifications at the lumpectomy site.  A stereotactic core needle biopsy is recommended.  2.  No mammographic evidence of malignancy in the left breast.     BI-RADS 4:  Suspicious.     RECOMMENDATION: Stereotactic core needle biopsy Right   Electronically signed by: ERWIN VARGAS M.D.    After discussion with the patient, she prefers not to proceed with biopsy at present.  She would prefer to wait and repeat the mammogram in 6 months.  We will abide by her wishes.    Unilateral Ultrasound-6/19/2023  FINDINGS: 2 surgical clips are seen within the right breast, one  within the retroareolar region and one within the periareolar region.  Again seen within the right axillary region, there is an approximately  3.7 mm in thickness lymph node which remains essentially unchanged in  appearance from the prior exam.  No solid or cystic mass or area of architectural distortion is noted.    I reviewed the images

## 2024-02-19 ENCOUNTER — HOSPITAL ENCOUNTER (OUTPATIENT)
Dept: WOMENS IMAGING | Age: 54
Discharge: HOME | End: 2024-02-19
Payer: MEDICAID

## 2024-02-19 DIAGNOSIS — Z98.890 STATUS POST RIGHT BREAST LUMPECTOMY: ICD-10-CM

## 2024-02-19 DIAGNOSIS — D05.11 INTRADUCTAL CARCINOMA IN SITU OF RIGHT BREAST: ICD-10-CM

## 2024-02-19 PROCEDURE — G0279 TOMOSYNTHESIS, MAMMO: HCPCS

## 2024-02-26 ENCOUNTER — OFFICE VISIT (OUTPATIENT)
Dept: SURGERY | Age: 54
End: 2024-02-26

## 2024-02-26 VITALS — HEART RATE: 80 BPM | HEIGHT: 57 IN | WEIGHT: 203 LBS | BODY MASS INDEX: 43.8 KG/M2

## 2024-02-26 DIAGNOSIS — D05.11 INTRADUCTAL CARCINOMA IN SITU OF RIGHT BREAST: ICD-10-CM

## 2024-02-26 DIAGNOSIS — R92.8 ABNORMAL MAMMOGRAM: Primary | ICD-10-CM

## 2024-02-26 DIAGNOSIS — Z98.890 STATUS POST RIGHT BREAST LUMPECTOMY: ICD-10-CM

## 2024-03-01 DIAGNOSIS — Z98.890 STATUS POST RIGHT BREAST LUMPECTOMY: ICD-10-CM

## 2024-03-01 DIAGNOSIS — D05.11 INTRADUCTAL CARCINOMA IN SITU OF RIGHT BREAST: ICD-10-CM

## 2024-03-04 RX ORDER — MONTELUKAST SODIUM 10 MG/1
10 TABLET ORAL NIGHTLY
Qty: 90 TABLET | Refills: 3 | Status: SHIPPED | OUTPATIENT
Start: 2024-03-04

## 2024-03-04 RX ORDER — TAMOXIFEN CITRATE 20 MG/1
20 TABLET ORAL DAILY
Qty: 90 TABLET | Refills: 3 | Status: SHIPPED | OUTPATIENT
Start: 2024-03-04

## 2024-08-02 ENCOUNTER — TELEPHONE (OUTPATIENT)
Dept: SURGERY | Age: 54
End: 2024-08-02

## 2024-08-02 NOTE — TELEPHONE ENCOUNTER
8/2/2024 Patient called and stated she is having really bad hot flashes with the Tamoxifen and is wanting to see if this needs to be adjusted.    Callback # [phone]

## 2024-08-02 NOTE — TELEPHONE ENCOUNTER
I spoke with patient to let her know I would address with Dr. Vargas on Monday and get back with her

## 2024-08-05 NOTE — TELEPHONE ENCOUNTER
Spoke with Dr. Vargas and he instructs to cut 20 mg tablet of Tamoxifen in half to take 10 mg daily to see if this helps with the hot flashes. Confirmed with patient.

## 2024-08-26 ENCOUNTER — HOSPITAL ENCOUNTER (OUTPATIENT)
Dept: WOMENS IMAGING | Age: 54
Discharge: HOME | End: 2024-08-26
Payer: COMMERCIAL

## 2024-08-26 ENCOUNTER — OFFICE VISIT (OUTPATIENT)
Dept: SURGERY | Age: 54
End: 2024-08-26

## 2024-08-26 VITALS — OXYGEN SATURATION: 97 % | BODY MASS INDEX: 44.88 KG/M2 | HEIGHT: 57 IN | WEIGHT: 208 LBS

## 2024-08-26 DIAGNOSIS — D05.11 INTRADUCTAL CARCINOMA IN SITU OF RIGHT BREAST: Primary | ICD-10-CM

## 2024-08-26 DIAGNOSIS — R92.8 ABNORMAL MAMMOGRAM: ICD-10-CM

## 2024-08-26 PROCEDURE — G0279 TOMOSYNTHESIS, MAMMO: HCPCS

## 2024-08-27 ENCOUNTER — TELEPHONE (OUTPATIENT)
Dept: BARIATRICS/WEIGHT MGMT | Facility: CLINIC | Age: 54
End: 2024-08-27
Payer: MEDICAID

## 2024-08-27 NOTE — TELEPHONE ENCOUNTER
Called and spoke to patient regarding scheduling a post op follow up appointment. Patient declined to schedule at this time and would like to be removed from the follow up list.

## 2024-08-29 DIAGNOSIS — D05.11 INTRADUCTAL CARCINOMA IN SITU OF RIGHT BREAST: ICD-10-CM

## 2024-08-29 DIAGNOSIS — Z98.890 STATUS POST RIGHT BREAST LUMPECTOMY: ICD-10-CM

## 2024-08-30 RX ORDER — TAMOXIFEN CITRATE 20 MG/1
20 TABLET ORAL DAILY
Qty: 90 TABLET | Refills: 3 | Status: SHIPPED | OUTPATIENT
Start: 2024-08-30

## 2024-08-30 RX ORDER — MONTELUKAST SODIUM 10 MG/1
10 TABLET ORAL NIGHTLY
Qty: 90 TABLET | Refills: 3 | Status: SHIPPED | OUTPATIENT
Start: 2024-08-30

## 2025-01-13 ENCOUNTER — TELEPHONE (OUTPATIENT)
Dept: SURGERY | Age: 55
End: 2025-01-13

## 2025-01-14 DIAGNOSIS — D05.11 INTRADUCTAL CARCINOMA IN SITU OF RIGHT BREAST: ICD-10-CM

## 2025-01-14 DIAGNOSIS — Z98.890 STATUS POST RIGHT BREAST LUMPECTOMY: ICD-10-CM

## 2025-01-14 RX ORDER — TAMOXIFEN CITRATE 20 MG/1
20 TABLET ORAL DAILY
Qty: 90 TABLET | Refills: 3 | Status: SHIPPED | OUTPATIENT
Start: 2025-01-14

## 2025-01-14 RX ORDER — MONTELUKAST SODIUM 10 MG/1
10 TABLET ORAL NIGHTLY
Qty: 90 TABLET | Refills: 3 | Status: SHIPPED | OUTPATIENT
Start: 2025-01-14

## 2025-01-14 NOTE — PROGRESS NOTES
Medication called to the patient pharmacy.    This has been electronically signed by Efra Caceres PA-C.

## 2025-03-13 ENCOUNTER — TELEPHONE (OUTPATIENT)
Dept: SURGERY | Age: 55
End: 2025-03-13

## 2025-03-13 DIAGNOSIS — D05.11 INTRADUCTAL CARCINOMA IN SITU OF RIGHT BREAST: ICD-10-CM

## 2025-03-13 DIAGNOSIS — Z98.890 STATUS POST RIGHT BREAST LUMPECTOMY: ICD-10-CM

## 2025-03-13 RX ORDER — MONTELUKAST SODIUM 10 MG/1
10 TABLET ORAL NIGHTLY
Qty: 90 TABLET | Refills: 3 | Status: SHIPPED | OUTPATIENT
Start: 2025-03-13

## 2025-03-13 RX ORDER — TAMOXIFEN CITRATE 20 MG/1
20 TABLET ORAL DAILY
Qty: 90 TABLET | Refills: 3 | Status: SHIPPED | OUTPATIENT
Start: 2025-03-13

## 2025-04-16 ENCOUNTER — TELEPHONE (OUTPATIENT)
Dept: SURGERY | Age: 55
End: 2025-04-16

## 2025-04-16 DIAGNOSIS — Z85.3 HISTORY OF BREAST CANCER: Primary | ICD-10-CM

## 2025-04-16 NOTE — TELEPHONE ENCOUNTER
Patient discussed f/u appt with Efra since Dr Vargas is no longer here - She needs an order for mammogram & f/u appt with Efra    Cc: [facility]

## 2025-04-16 NOTE — TELEPHONE ENCOUNTER
Looks like pt is behind on imaging. Is she to have bilat diag mammo or R diag mammo & when? Forwarding to Efra

## 2025-07-10 NOTE — PROGRESS NOTES
This note/encounter/order is being administratively closed in accordance with the defined  policies, procedures and workflows established by [facility]. I cannot validate the clinical  content accuracy of this information.

## (undated) DEVICE — NEPTUNE E-SEP SMOKE EVACUATION PENCIL, COATED, 70MM BLADE, ROCKER SWITCH: Brand: NEPTUNE E-SEP

## (undated) DEVICE — CANNULA SEAL

## (undated) DEVICE — DRESSING TRNSPAR W5XL4.5IN FLM SHT SEMIPERMEABLE WIND

## (undated) DEVICE — TUBING, SUCTION, 1/4" X 20', STRAIGHT: Brand: MEDLINE INDUSTRIES, INC.

## (undated) DEVICE — THE CHANNEL CLEANING BRUSH IS A NYLON FLEXI BRUSH ATTACHED TO A FLEXIBLE PLASTIC SHEATH DESIGNED TO SAFELY REMOVE DEBRIS FROM FLEXIBLE ENDOSCOPES.

## (undated) DEVICE — BNDR ABD 4PANEL 12IN 74TO85IN

## (undated) DEVICE — SUTURE VCRL SZ 3-0 L27IN ABSRB UD L26MM SH 1/2 CIR J416H

## (undated) DEVICE — NDL HYPO PRECISIONGLIDE REG 22G 1 1/2

## (undated) DEVICE — GOWN,PREVENTION PLUS,2XL,ST,22/CS: Brand: MEDLINE

## (undated) DEVICE — SYRINGE IRRIG 60ML SFT PLIABLE BLB EZ TO GRP 1 HND USE W/

## (undated) DEVICE — KT CLN CLEANOR SCPE

## (undated) DEVICE — SUTURE MNCRYL STRATAFIX PS 4-0 30CM

## (undated) DEVICE — GOWN,PREVENTION PLUS,XL,ST,24/CS: Brand: MEDLINE

## (undated) DEVICE — TBG SMPL FLTR LINE NASL 02/C02 A/ BX/100

## (undated) DEVICE — CUFF,BP,DISP,1 TUBE,ADULT,HP: Brand: MEDLINE

## (undated) DEVICE — ANTIBACTERIAL UNDYED BRAIDED (POLYGLACTIN 910), SYNTHETIC ABSORBABLE SUTURE: Brand: COATED VICRYL

## (undated) DEVICE — DAVINCI: Brand: MEDLINE INDUSTRIES, INC.

## (undated) DEVICE — GLOVE SURG SZ 75 CRM LTX FREE POLYISOPRENE POLYMER BEAD ANTI

## (undated) DEVICE — 2, DISPOSABLE SUCTION/IRRIGATOR WITHOUT DISPOSABLE TIP: Brand: STRYKEFLOW

## (undated) DEVICE — SENSR O2 OXIMAX FNGR A/ 18IN NONSTR

## (undated) DEVICE — ARM DRAPE

## (undated) DEVICE — Device: Brand: DEFENDO AIR/WATER/SUCTION AND BIOPSY VALVE

## (undated) DEVICE — ST TBG AIRSEAL FLTR TRI LUM

## (undated) DEVICE — 3 ML SYRINGE WITH HYPODERMIC SAFETY NEEDLE: Brand: MAGELLAN

## (undated) DEVICE — PACK,UNIVERSAL,NO GOWNS: Brand: MEDLINE

## (undated) DEVICE — SEAL

## (undated) DEVICE — BLADELESS OBTURATOR: Brand: WECK VISTA

## (undated) DEVICE — TRAP FLD MINIVAC MEGADYNE 100ML

## (undated) DEVICE — GLV SURG BIOGEL M LTX PF 8

## (undated) DEVICE — MAT PREVALON MOBL TRANSFR AIR W/PAD REPROC 39X81IN

## (undated) DEVICE — CATHETER URETH 24FR 30CC BLLN SILAS F 2 W SPEC M RND TIP

## (undated) DEVICE — VISIGI 3D®  CALIBRATION SYSTEM  SIZE 40FR STD W/ BULB: Brand: BOEHRINGER® VISIGI 3D™ SLEEVE GASTRECTOMY CALIBRATION SYSTEM, SIZE 40FR W/BULB

## (undated) DEVICE — TBG PENCL TELESCP MEGADYNE SMOKE EVAC 10FT

## (undated) DEVICE — ADHS SKIN PREMIERPRO EXOFIN TOPICAL HI/VISC .5ML

## (undated) DEVICE — FRCP BX RADJAW4 NDL 2.8 240 STD OG

## (undated) DEVICE — DRESSING GERM 6-12FR DIA1IN CNTR H 4MM ANTIMIC PROTCT DISK

## (undated) DEVICE — SUT MNCRYL 4/0 PS2 27IN UD MCP426H

## (undated) DEVICE — SUREFORM 45 RELOAD BLUE
Type: IMPLANTABLE DEVICE | Site: STOMACH | Status: NON-FUNCTIONAL
Brand: SUREFORM

## (undated) DEVICE — VESSEL SEALER EXTEND: Brand: ENDOWRIST

## (undated) DEVICE — SYRINGE 20ML LL S/C 50

## (undated) DEVICE — SYS CLOSE PORTII CARTR/THOMASN XL

## (undated) DEVICE — Device

## (undated) DEVICE — GAUZE,SPONGE,FLUFF,6"X6.75",STRL,10/TRAY: Brand: MEDLINE

## (undated) DEVICE — CONMED SCOPE SAVER BITE BLOCK, 20X27 MM: Brand: SCOPE SAVER

## (undated) DEVICE — ADHESIVE SKIN CLOSURE WND 8.661X1.5 IN 22 CM LIQUIBAND SECUR

## (undated) DEVICE — 60 ML SYRINGE,ECCENTRIC TIP: Brand: MONOJECT

## (undated) DEVICE — ENDOGATOR AUXILIARY WATER JET CONNECTOR: Brand: ENDOGATOR

## (undated) DEVICE — PEN: MARKING STD 100/CS: Brand: MEDICAL ACTION INDUSTRIES

## (undated) DEVICE — PK TURNOVER RM ADV

## (undated) DEVICE — ENDOPATH XCEL WITH OPTIVIEW TECHNOLOGY BLADELESS TROCARS WITH STABILITY SLEEVES: Brand: ENDOPATH XCEL OPTIVIEW

## (undated) DEVICE — SUTURE VCRL SZ 2-0 L36IN ABSRB UD L36MM CT-1 1/2 CIR J945H

## (undated) DEVICE — PROBE DTECT MARGIN F/LUMPECTOMY

## (undated) DEVICE — SPECIMEN ORIENTATION CHARMS, SIX DISTINCTLY SHAPED STERILE 10MM CHARMS: Brand: MARGINMAP

## (undated) DEVICE — GLOVE SURG SZ 75 L12IN FNGR THK79MIL GRN LTX FREE

## (undated) DEVICE — MAJOR CDS

## (undated) DEVICE — SUT VIC 0 SUTUPAK TIES 18IN J906G

## (undated) DEVICE — TOWEL,OR,DSP,ST,BLUE,DLX,4/PK,20PK/CS: Brand: MEDLINE

## (undated) DEVICE — REDUCER: Brand: ENDOWRIST

## (undated) DEVICE — BANDAGE,GAUZE,BULKEE II,4.5"X4.1YD,STRL: Brand: MEDLINE

## (undated) DEVICE — STAPLER 60: Brand: SUREFORM

## (undated) DEVICE — SUTURE PROL SZ 2-0 L30IN NONABSORBABLE BLU L26MM CT-2 1/2 8411H

## (undated) DEVICE — 3M™ IOBAN™ 2 ANTIMICROBIAL INCISE DRAPE 6650EZ: Brand: IOBAN™ 2

## (undated) DEVICE — PACKAGE ASSY BALLOON POUCH ST 5-6CM

## (undated) DEVICE — PLASMABLADE PS210-030S 3.0S LOCK: Brand: PLASMABLADE™

## (undated) DEVICE — SUTURE PERMAHAND SZ 2-0 L18IN NONABSORBABLE BLK L26MM SH C012D

## (undated) DEVICE — SHEET,DRAPE,53X77,STERILE: Brand: MEDLINE

## (undated) DEVICE — SUT ETHIB 2/0 CV V7 D/A 30IN X987H